# Patient Record
Sex: MALE | Race: WHITE
[De-identification: names, ages, dates, MRNs, and addresses within clinical notes are randomized per-mention and may not be internally consistent; named-entity substitution may affect disease eponyms.]

---

## 2017-04-28 ENCOUNTER — HOSPITAL ENCOUNTER (OUTPATIENT)
Dept: HOSPITAL 74 - FASU | Age: 57
Discharge: HOME | End: 2017-04-28
Attending: ORTHOPAEDIC SURGERY
Payer: COMMERCIAL

## 2017-04-28 VITALS — DIASTOLIC BLOOD PRESSURE: 74 MMHG | HEART RATE: 81 BPM | SYSTOLIC BLOOD PRESSURE: 121 MMHG

## 2017-04-28 VITALS — TEMPERATURE: 98.3 F

## 2017-04-28 VITALS — BODY MASS INDEX: 42.7 KG/M2

## 2017-04-28 DIAGNOSIS — M65.811: ICD-10-CM

## 2017-04-28 DIAGNOSIS — S43.431A: ICD-10-CM

## 2017-04-28 DIAGNOSIS — M25.811: ICD-10-CM

## 2017-04-28 DIAGNOSIS — M75.101: Primary | ICD-10-CM

## 2017-04-28 DIAGNOSIS — S43.491A: ICD-10-CM
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
## 2017-04-28 DIAGNOSIS — M66.821: ICD-10-CM
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
## 2017-04-28 PROCEDURE — 0RNJ4ZZ RELEASE RIGHT SHOULDER JOINT, PERCUTANEOUS ENDOSCOPIC APPROACH: ICD-10-PCS | Performed by: ORTHOPAEDIC SURGERY

## 2017-04-28 PROCEDURE — 0LB14ZZ EXCISION OF RIGHT SHOULDER TENDON, PERCUTANEOUS ENDOSCOPIC APPROACH: ICD-10-PCS | Performed by: ORTHOPAEDIC SURGERY

## 2017-04-28 PROCEDURE — 0PB94ZZ EXCISION OF RIGHT CLAVICLE, PERCUTANEOUS ENDOSCOPIC APPROACH: ICD-10-PCS | Performed by: ORTHOPAEDIC SURGERY

## 2017-04-28 PROCEDURE — 0RBJ4ZZ EXCISION OF RIGHT SHOULDER JOINT, PERCUTANEOUS ENDOSCOPIC APPROACH: ICD-10-PCS | Performed by: ORTHOPAEDIC SURGERY

## 2017-04-28 PROCEDURE — 0RQJ4ZZ REPAIR RIGHT SHOULDER JOINT, PERCUTANEOUS ENDOSCOPIC APPROACH: ICD-10-PCS | Performed by: ORTHOPAEDIC SURGERY

## 2017-05-01 NOTE — OP
DATE OF OPERATION:  04/28/2017

 

SURGEON:  Andres Arroyo MD

 

ASSISTANT:  FAUSTINO Maynard

 

PREOPERATIVE DIAGNOSIS:   

1.  Right shoulder rotator cuff tear.

2.  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

3.  Right shoulder labral tear and anterior and posterior synovitis. 

4.  Right shoulder biceps tendon long head tear. 

 

POSTOPERATIVE DIAGNOSIS:    

1.  Right shoulder rotator cuff tear.

2.  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

3.  Right shoulder labral tear and anterior and posterior synovitis. 

4.  Right shoulder biceps tendon long head tear. 

 

PROCEDURE:  

1.  Right shoulder arthroscopy with rotator cuff repair. 

2.  Right shoulder arthroscopy with subacromial decompression. 

3.  Right shoulder arthroscopy with resection of distal clavicle acromioclavicular

joint.

4.  Right shoulder arthroscopy with debridement of synovectomy major.

5.  Right shoulder arthroscopy with biceps tendon release. CPT code 18953, 18301,

24317, 25746, 13572.

 

FINDINGS:

1.  90% tearing of the long head biceps tendon with dislocation. 

2.  Extensive full-thickness tear of the supraspinatus and infraspinatus tendons with

retraction to midpoint glenohumeral joint.

3.  Thickness of scar, subacromial space.

4.  Anterolateral spurring of acromion. 

5.  Inferior superior clavicle degenerative joint disease. 

 

REPAIR TYPE:  Four mattress sutures were placed into the supraspinatus and secured to

a bleeding bone bed.  They were done in adapted proximal and distal row technique and

secured to bleeding bone bed with direct contact.  Biceps tendon was released due to

extensive tearing debrided.  

 

Please note that the distal clavicle resection including undersurface of greater than

1 cm including articular portion.  

 

PROCEDURE:  Informed consent was obtained.  The patient was taken to the operating

room where the upper extremity was prepped and draped in a sterile fashion.  The

shoulder was manipulated for a full range of motion. 

 

Posterior incision portal was made and directed to glenohumeral joint.  Under direct

visualization, an anterior incision and portal was made.  Extensive synovitis, as

well as chondral injuries throughout the glenohumeral joint were dbrided and removed.

 Any identified labral injuries, including superior labral tear, anterior and

posterior, and anterior labrum torn portions were removed as well.  Rotator cuff was

visualized and noted to have full-thickness tear.  The edges were debrided. 

Posterior incision portal was redirected to subacromial space where a lateral

incision portal was made.  Excessive and thickened scar tissue noted throughout the

subacromial space, including bursal and scar tissue, were removed.  The type 2

acromion was converted into a flattened type 1 using a kiley for subacromial

decompression.  Distal inferior spur at the distal clavicle was also dbrided with the

use of accessory portal in the AC joint. 

 

The edges of the rotator cuff were identified.  Sutures were placed into the rotator

cuff and secured using anchors throughout the greater tuberosity.  Prior to securing,

a bleeding bed was made using a small kiley, creating a bleeding surface of the

rotator cuff insertion. 

 

The shoulder was then drained.  A single suture as placed on all portals and a

sterile dressing was placed.  The patient was transferred to the recovery room

without complication. 

 

 

ANDRES ARROYO M.D.

 

[id number]

DD: 04/30/2017 20:42

DT: 05/01/2017 00:10

Job #:  [id number]

## 2017-05-03 NOTE — PATH
Surgical Pathology Report



Patient Name:  IVET MACIAS

Accession #:  

[facility]. Rec. #:  [id number]                                                        

   /Age/Gender:  1960 (Age: 57) / M

Account:  [id number]                                                          

             [facility] 

Taken:  2017

Received:  2017

Reported:  5/3/2017

Physicians:  Andres García M.D.

  



Specimen(s) Received

 RIGHT SHOULDER SHAVINGS 





Clinical History

Right shoulder rotator cuff tear







Final Diagnosis

RIGHT SHOULDER, ARTHROSCOPIC SHAVING: 

PORTIONS OF SYNOVIUM, CARTILAGE, SKELETAL MUSCLE AND BONE CONSISTENT WITH

ARTHROSCOPIC SHAVINGS.





***Electronically Signed***

Jj Edmond M.D.





Gross Description

Received in formalin, labeled "right shoulder shavings," is a 3.0 x 3.0 x 0.3

cm. aggregate of tan-yellow soft tissue fragments. A representative portion is

submitted in one cassette.

## 2017-09-29 ENCOUNTER — HOSPITAL ENCOUNTER (OUTPATIENT)
Dept: HOSPITAL 74 - FASU | Age: 57
Discharge: HOME | End: 2017-09-29
Attending: ORTHOPAEDIC SURGERY
Payer: COMMERCIAL

## 2017-09-29 VITALS — SYSTOLIC BLOOD PRESSURE: 129 MMHG | HEART RATE: 77 BPM | DIASTOLIC BLOOD PRESSURE: 77 MMHG

## 2017-09-29 VITALS — TEMPERATURE: 98.2 F

## 2017-09-29 VITALS — BODY MASS INDEX: 43.4 KG/M2

## 2017-09-29 DIAGNOSIS — M65.811: ICD-10-CM

## 2017-09-29 DIAGNOSIS — M75.101: Primary | ICD-10-CM

## 2017-09-29 DIAGNOSIS — M75.41: ICD-10-CM

## 2017-09-29 PROCEDURE — 0RBJ4ZZ EXCISION OF RIGHT SHOULDER JOINT, PERCUTANEOUS ENDOSCOPIC APPROACH: ICD-10-PCS | Performed by: ORTHOPAEDIC SURGERY

## 2017-09-29 PROCEDURE — 0RNJ4ZZ RELEASE RIGHT SHOULDER JOINT, PERCUTANEOUS ENDOSCOPIC APPROACH: ICD-10-PCS | Performed by: ORTHOPAEDIC SURGERY

## 2017-09-29 PROCEDURE — 0LB14ZZ EXCISION OF RIGHT SHOULDER TENDON, PERCUTANEOUS ENDOSCOPIC APPROACH: ICD-10-PCS | Performed by: ORTHOPAEDIC SURGERY

## 2017-10-02 NOTE — PATH
Surgical Pathology Report



Patient Name:  IVET MACIAS

Accession #:  [id number]

Med. Rec. #:  [id number]                                                        

   /Age/Gender:  1960 (Age: 57) / M

Account:  [id number]                                                          

             Location: [facility] AMBULATORY 

Taken:  2017

Received:  2017

Reported:  10/2/2017

Physicians:  Andres García M.D.

  



Specimen(s) Received

 RIGHT SHOULDER IMPLANT AND SHAVINGS 





Clinical History

Right shoulder rotator cuff tear







Final Diagnosis

RIGHT SHOULDER IMPLANT AND SHAVINGS:

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

SMALL FRAGMENTS OF UNREMARKABLE BONE AND SKELETAL MUSCLE.

ORTHOPEDIC HARDWARE (GROSS EXAM).





***Electronically Signed***

Alexander Finkelstein, M.D.





Gross Description

Received in formalin, labeled "right shoulder implant and shavings," is a 2.5 cm

in greatest dimension aggregate of tan-yellow soft tissue fragments. A

representative portion is submitted in one cassette.

Also present are 2 portions of metallic hardware each of which measure 0.8 x 0.3

x 0.3 cm, and have attached possible suture material. This is for gross

identification only.

[facility]/2017



[facility]/2017

## 2017-10-07 NOTE — OP
DATE OF OPERATION: 09/29/2017 

 

SURGEON: Andres Arroyo MD

 

ASSISTANT:  FAUSTINO Maynard

 

PREOPERATIVE DIAGNOSES:  

1.  Right shoulder rotator cuff tear.

2.  Right shoulder impingement syndrome.

3.  Right shoulder synovitis.

 

POSTOPERATIVE DIAGNOSES:

1.  Right shoulder rotator cuff tear.

2.  Right shoulder impingement syndrome.

3.  Right shoulder synovitis.

 

PROCEDURE:

1.  Right shoulder arthroscopy with revision repair of supraspinatus rotator cuff

tear.

2.  Right shoulder arthroscopy with subacromial decompression.

3.  Right shoulder arthroscopy with debridement.

 

FINDINGS:

1.  Previous rotator cuff repair with retear and extension into infraspinatus.

2.  No evidence of long head of biceps.

3.  Extensive scar tissue anteriorly and laterally subacromial space.

4.  Previous subacromial decompression with minor posterior spurring recurrence. 

5.  Posterior labral fraying with diffuse grade 2-4 cartilage injury glenoid and

humerus.

 

REPAIR TYPE:  Loose anchors were removed throughout the area site of previous repair.

 Sutures were also removed.  

 

The remnants of the supraspinatus and infraspinatus tendons were freed from scarring

to allow for distraction into the superior portion of the humeral head.  Bleeding

bone bed was made along the cartilage portion of the superior portion of the humerus

to allow for shortening of the rotator cuff and to decrease tension on the repair. 

Anchors were placed across the greater tuberosity lateral to the bleeding bone bed. 

Mattress sutures were placed into the supraspinatus and infraspinatus and secured to

the bleeding bone bed with tension across the anchors.  These were then probed and

found to have good stability.  

 

PROCEDURE:  Informed consent was obtained.  The patient was taken to the operating

room where the upper extremity was prepped and draped in a sterile fashion.  The

shoulder was manipulated for a full range of motion. 

 

Posterior incision portal was made and directed to glenohumeral joint.  Under direct

visualization, an anterior incision and portal was made.  Extensive synovitis, as

well as chondral injuries throughout the glenohumeral joint were dbrided and removed.

 Any identified labral injuries, including superior labral tear, anterior and

posterior, and anterior labrum torn portions were removed as well.  Rotator cuff was

visualized and noted to have full-thickness tear.  The edges were debrided. 

Posterior incision portal was redirected to subacromial space where a lateral

incision portal was made.  Excessive and thickened scar tissue noted throughout the

subacromial space, including bursal and scar tissue, were removed.  The type 2

acromion was converted into a flattened type 1 using a kiley for subacromial

decompression.  Distal inferior spur at the distal clavicle was also dbrided with the

use of accessory portal in the AC joint. 

 

The edges of the rotator cuff were identified.  Sutures were placed into the rotator

cuff and secured using anchors throughout the greater tuberosity.  Prior to securing,

a bleeding bed was made using a small kiley, creating a bleeding surface of the

rotator cuff insertion. 

 

The shoulder was then drained.  A single suture as placed on all portals and a

sterile dressing was placed.  The patient was transferred to the recovery room

without complication. 

 

 

 

ANDRES ARROYO M.D.

 

[id number]

DD: 10/07/2017 14:29

DT: 10/07/2017 14:52

Job #:  [id number]

## 2019-08-24 ENCOUNTER — HOSPITAL ENCOUNTER (INPATIENT)
Dept: HOSPITAL 74 - FER | Age: 59
LOS: 6 days | Discharge: HOME | DRG: 392 | End: 2019-08-30
Attending: NURSE PRACTITIONER | Admitting: INTERNAL MEDICINE
Payer: COMMERCIAL

## 2019-08-24 VITALS — BODY MASS INDEX: 44 KG/M2

## 2019-08-24 DIAGNOSIS — I10: ICD-10-CM

## 2019-08-24 DIAGNOSIS — G47.33: ICD-10-CM

## 2019-08-24 DIAGNOSIS — D72.829: ICD-10-CM

## 2019-08-24 DIAGNOSIS — K57.20: Primary | ICD-10-CM

## 2019-08-24 DIAGNOSIS — E66.01: ICD-10-CM

## 2019-08-24 DIAGNOSIS — K21.9: ICD-10-CM

## 2019-08-24 LAB
ALBUMIN SERPL-MCNC: 3.6 G/DL (ref 3.4–5)
ALP SERPL-CCNC: 50 U/L (ref 45–117)
ALT SERPL-CCNC: 20 U/L (ref 13–61)
ANION GAP SERPL CALC-SCNC: 4 MMOL/L (ref 8–16)
AST SERPL-CCNC: 23 U/L (ref 15–37)
BASOPHILS # BLD: 0.5 % (ref 0–2)
BILIRUB SERPL-MCNC: 1.2 MG/DL (ref 0.2–1)
BUN SERPL-MCNC: 15 MG/DL (ref 7–18)
CALCIUM SERPL-MCNC: 8.4 MG/DL (ref 8.5–10)
CHLORIDE SERPL-SCNC: 102 MMOL/L (ref 98–107)
CO2 SERPL-SCNC: 27 MMOL/L (ref 21–32)
CREAT SERPL-MCNC: 1.1 MG/DL (ref 0.55–1.3)
DEPRECATED RDW RBC AUTO: 13.7 % (ref 11.9–15.9)
EOSINOPHIL # BLD: 1.2 % (ref 0–4.5)
EPITH CASTS URNS QL MICRO: (no result) /HPF
GLUCOSE SERPL-MCNC: 100 MG/DL (ref 74–106)
HCT VFR BLD CALC: 49.8 % (ref 35.4–49)
HGB BLD-MCNC: 16.4 GM/DL (ref 11.7–16.9)
LYMPHOCYTES # BLD: 13.3 % (ref 8–40)
MCH RBC QN AUTO: 30.4 PG (ref 25.7–33.7)
MCHC RBC AUTO-ENTMCNC: 33 G/DL (ref 32–35.9)
MCV RBC: 92.1 FL (ref 80–96)
MONOCYTES # BLD AUTO: 6.7 % (ref 3.8–10.2)
MUCOUS THREADS URNS QL MICRO: (no result)
NEUTROPHILS # BLD: 78.3 % (ref 42.8–82.8)
PLATELET # BLD AUTO: 225 K/MM3 (ref 134–434)
PMV BLD: 9.1 FL (ref 7.5–11.1)
POTASSIUM SERPLBLD-SCNC: 4.1 MMOL/L (ref 3.5–5.1)
PROT SERPL-MCNC: 6.8 G/DL (ref 6.4–8.2)
RBC # BLD AUTO: 5.41 M/MM3 (ref 4–5.6)
SODIUM SERPL-SCNC: 133 MMOL/L (ref 136–145)
WBC # BLD AUTO: 12.2 K/MM3 (ref 4–10.8)

## 2019-08-24 NOTE — PDOC
Attending Attestation





- Resident


Resident Name: Nazario Bakrer





- ED Attending Attestation


I have performed the following: I have examined & evaluated the patient, The 

case was reviewed & discussed with the resident, I agree w/resident's findings 

& plan, Exceptions are as noted





- HPI


HPI: 





08/24/19 17:42


59 M with h/o HTN, GERD, presenting to ED with 2 days of abdominal pain. Pt 

states it started yesterday morning. Pain is localized to LLQ and suprapubic 

region. Does not radiate. Denies N/V but endorses loss of appetite. No diarrhea/

constipation. Denies F/C. Pt went to urgent care yesterday and was prescribed 

flagyl and levaquin, which he has been taking. However, the pain has been 

persistent, so pt presents to ED for further evaluation.


Pt denies CP/SOB. Denies dysuria. Denies flank pain. Denies scrotal pain.








- Physicial Exam


PE: 





08/24/19 17:47


"GENERAL: Awake, alert, and fully oriented, in no acute distress.


HEAD: No signs of trauma


EYES: PERRLA, EOMI, sclera anicteric, conjunctiva clear


ENT: Auricles normal inspection, hearing grossly normal, nares patent, 

oropharynx clear without exudates. Moist mucosa


NECK: Nontender, no stepoffs, Normal ROM, supple, no lymphadenopathy, JVD, or 

masses


LUNGS: Breath sounds equal, clear to auscultation bilaterally.  No wheezes, and 

no crackles


HEART: Regular rate and rhythm, normal S1 and S2, no murmurs, rubs or gallops


ABDOMEN: + LLQ TTP, normoactive bowel sounds.  No guarding, no rebound.  No 

masses


EXTREMITIES: Normal range of motion, no edema.  No clubbing or cyanosis. No 

cords, erythema, or tenderness


NEUROLOGICAL: Cranial nerves II through XII intact. 5/5 strength and sensation 

in all extremities, Normal speech, normal gait, normal cerebellar function


SKIN: Warm, Dry, normal turgor, no rashes or lesions noted.





- Medical Decision Making





08/24/19 17:49


59 M with LLQ abdominal pain. Likely colitis vs diverticulitis.


- Labs


- CTAP


- IV tylenol





Pt signed out to Dr. Alvarez at 7PM, pending labs and CT

## 2019-08-24 NOTE — PDOC
History of Present Illness





- General


Chief Complaint: Pain


Stated Complaint: ABDOMINAL PAIN


Time Seen by Provider: 08/24/19 17:11


History Source: Patient


Exam Limitations: No Limitations





- History of Present Illness


Initial Comments: 





59M pmh HTN presenting with 1 day of constant crampy nonradiating LLQ pain w/ a 

few days of LINDSEY but able to tolerate PO. Denies f/c/n/v/d, dysuria, testicular 

pain/swelling. Was seen in urgent care on 08/23/19 for LLQ abdominal pain and 

instructed to proceed to ED for further w/u; r/o diverticulitis. Was discharged 

with flagyl and levaquin. Last BM today. Took tramadol for pain today. 





PMH HTN 


MEDs see chart 


NKDA 


Denies tobacco, drugs 


Endorses social etoh 











Past History





- Past Medical History


Allergies/Adverse Reactions: 


 Allergies











Allergy/AdvReac Type Severity Reaction Status Date / Time


 


No Known Drug Allergies Allergy   Verified 08/24/19 17:16











Home Medications: 


Ambulatory Orders





Cholecalciferol (Vitamin D3) [Vitamin D3] 2,000 unit PO DAILY 04/24/17 


Lansoprazole [Prevacid] 30 mg PO DAILY 04/24/17 


Multivitamins [Multivit ([facility] Formulary)] 1 tab PO DAILY 04/24/17 


Nebivolol HCl [Bystolic] 10 mg PO DAILY 04/24/17 


traMADol HCL [Ultram -] 50 mg PO Q4H PRN 04/24/17 


Levofloxacin [Levaquin] 750 mg PO DAILY 08/24/19 


Valsartan 320 mg PO DAILY 08/24/19 


metroNIDAZOLE [Flagyl -] 500 mg PO QID 08/24/19 








Anemia: No


Asthma: No


Cancer: No


Cardiac Disorders: No


CVA: No


COPD: No


CHF: No


Dementia: No


Diabetes: No


GI Disorders: Yes (GERD)


 Disorders: No


HTN: Yes


Hypercholesterolemia: No


Liver Disease: No


Seizures: No


Thyroid Disease: No





- Surgical History


Abdominal Surgery: Yes (??Hernia Repair)


Appendectomy: No


Cardiac Surgery: No


Cholecystectomy: No


Lung Surgery: No


Neurologic Surgery: No


Orthopedic Surgery: Yes (Right Knee Arthroscopy,R shoulder arthroscopy)





- Suicide/Smoking/Psychosocial Hx


Smoking History: Never smoked


Hx Alcohol Use: Yes (social)


Drug/Substance Use Hx: No


Substance Use Type: Alcohol, Prescribed


Hx Substance Use Treatment: No





**Review of Systems





- Review of Systems


Able to Perform ROS?: Yes


Comments:: 


CONSTITUTIONAL: Denies F / C


HEENT: Denies headache, lightheadedness, dizziness, changes in vision / hearing

, diplopia, blurry vision. 


RESP: Denies SOB


CARD: Denies chest pain


GI: see hpi. Denies N / V / D, bloody stool, inability to tolerate PO


: Denies dysuria, hematuria


MSK: Denies joint pain











Is the patient limited English proficient: No





*Physical Exam





- Physical Exam


Comments: 





GEN: Nontoxic. AAOx3


HEENT: NC/AT, EOMI, PERRLA. No facial asymmetry. Normal voice. Supple neck w/ 

FROM.


CV: S1/S2, RRR, no m/r/g


LUNG: CTAB, no wheezes, crackles, rales, rhonchi. 


GI: (+)TTP of RLQ, SUPRAPUBIC, AND LLQ w/ guarding. Upper quadrants soft. 

nondistended, +BS, no rebound. No hernias or pulsatile masses palpated. 


: No scrotal or penile swelling. No testicular TTP, erythema, or swelling. No 

hernias or testicular masses palpated. 


EXTREMITIES: No obvious deformities of all extremities. 


SKIN: warm, dry, normal turgor 


PSYCH: normal mood and affect  


NEURO: Moving all extremities well; ambulating well. 





ED Treatment Course





- LABORATORY


CBC & Chemistry Diagram: 


 08/27/19 07:16





 08/27/19 07:16





Medical Decision Making





- Medical Decision Making





08/24/19 17:37








59M presenting with LLQ pain in setting of LINDSEY, was seen in urgent care and 

instructed to go to ED. Sent home w/ flagyl and levaquin. Exam significant for 

RLQ, suprapubic, and LLQ TTP. VS wnl. DDX most likely diverticulitis given hx; 

considering appendicitis, cystitis/UTI. Unlikely ischemic colitis - hx 

inconsistent; pain not out of proportion. Unlikely AAA or AoD given history and 

exam but considering given age and HTN. 


- labs 


- IV fluids


- pain control 


- CT A/P 








08/24/19 20:52


CT A/P demonstrating extensive sigmoid diverticulitis with tiny extraluminal 

air within the adjacent interstitum (pneumoperitoneum). No abscess. 


Surgery contacted. 


Zosyn started. 


Patient is resistant to the idea of admission; may AMA 





08/24/19 21:16


Discussed patient with Surgeon on call Dr. Miles who stated no surgical 

intervention needed at this time, needs transfer to [facility], IVF, IV Abx, 

NPO. 











08/24/19 21:33


There are no beds at [facility] at this moment, notified Dr. Miles regarding this. 

If patient amenable, will admit to [facility] and transfer when [facility] bed 

available. 





08/24/19 22:10


Discussed Patient with CONCHITA Pena; he will speak to Dr. Batres regarding 

transfer. Will reach back. 








Signed out to attending. 








*DC/Admit/Observation/Transfer


Diagnosis at time of Disposition: 


 Diverticulitis








- Discharge Dispostion


Condition at time of disposition: Guarded


Decision to Admit order: Yes





- Referrals





- Patient Instructions





- Post Discharge Activity

## 2019-08-24 NOTE — PDOC
*Physical Exam





- Vital Signs


 Last Vital Signs











Temp Pulse Resp BP Pulse Ox


 


 98.5 F   88   18   112/77   97 


 


 08/24/19 17:11  08/24/19 17:11  08/24/19 17:11  08/24/19 17:11  08/24/19 17:11














ED Treatment Course





- LABORATORY


CBC & Chemistry Diagram: 


 08/24/19 17:50





 08/24/19 17:50





- ADDITIONAL ORDERS


Additional order review: 


 Laboratory  Results











  08/24/19 08/24/19





  17:50 17:50


 


Sodium   133 L


 


Potassium   4.1


 


Chloride   102


 


Carbon Dioxide   27


 


Anion Gap   4 L


 


BUN   15.0


 


Creatinine   1.1


 


Est GFR (CKD-EPI)AfAm   83.53


 


Est GFR (CKD-EPI)NonAf   72.07


 


Random Glucose   100


 


Calcium   8.4 L


 


Total Bilirubin   1.2 H


 


AST   23


 


ALT   20


 


Alkaline Phosphatase   50


 


Total Protein   6.8


 


Albumin   3.6


 


Lipase  57 L 








 











  08/24/19





  17:50


 


RBC  5.41


 


MCV  92.1


 


MCHC  33.0


 


RDW  13.7


 


MPV  9.1


 


Neutrophils %  78.3


 


Lymphocytes %  13.3


 


Monocytes %  6.7


 


Eosinophils %  1.2


 


Basophils %  0.5














- Medications


Given in the ED: 


ED Medications














Discontinued Medications














Generic Name Dose Route Start Last Admin





  Trade Name Freq  PRN Reason Stop Dose Admin


 


Acetaminophen  1,000 mg  08/24/19 17:36  08/24/19 17:55





  Ofirmev Injection -  IVPB  08/24/19 17:37  1,000 mg





  ONCE ONE   Administration





     





     





     





     


 


Sodium Chloride  1,000 ml  08/24/19 17:36  08/24/19 17:50





  Normal Saline -  IV  08/24/19 17:37  1,000 ml





  ONCE ONE   Administration





     





     





     





     














Progress Note





- Progress Note


Progress Note: 





Care of this patient was transferred to me from Dr. fink at 1900 hrs. Patient is 

a 59-year-old male who was started on antibiotics for presumptive 

diverticulitis. Patient's been on the antibiotics 2 days and now comes into 

the emergency room for a CAT scan. Patient was told at the time he was started 

on the antibiotics he should go to the emergency room for a CAT scan but did 

not go for a couple of days. Patient comes otherwise are improving. Patient has 

a CAT scan pending if there is no evidence of perforation or abscess patient 

will be discharged home to continue his antibiotics. 





*DC/Admit/Observation/Transfer


Diagnosis at time of Disposition: 


 Diverticulitis








- Discharge Dispostion


Condition at time of disposition: Guarded


Decision to Admit order: Yes





- Referrals


Referrals: 


Efren Keenan MD [Primary Care Provider] - 





- Patient Instructions





- Post Discharge Activity

## 2019-08-25 LAB
ANION GAP SERPL CALC-SCNC: 9 MMOL/L (ref 8–16)
BUN SERPL-MCNC: 14 MG/DL (ref 7–18)
CALCIUM SERPL-MCNC: 8 MG/DL (ref 8.5–10)
CHLORIDE SERPL-SCNC: 100 MMOL/L (ref 98–107)
CO2 SERPL-SCNC: 27 MMOL/L (ref 21–32)
CREAT SERPL-MCNC: 1 MG/DL (ref 0.55–1.3)
DEPRECATED RDW RBC AUTO: 13.3 % (ref 11.9–15.9)
GLUCOSE SERPL-MCNC: 78 MG/DL (ref 74–106)
HCT VFR BLD CALC: 45.3 % (ref 35.4–49)
HGB BLD-MCNC: 14.6 GM/DL (ref 11.7–16.9)
MAGNESIUM SERPL-MCNC: 1.8 MG/DL (ref 1.8–2.4)
MCH RBC QN AUTO: 29.5 PG (ref 25.7–33.7)
MCHC RBC AUTO-ENTMCNC: 32.3 G/DL (ref 32–35.9)
MCV RBC: 91.3 FL (ref 80–96)
PLATELET # BLD AUTO: 204 K/MM3 (ref 134–434)
PMV BLD: 9.8 FL (ref 7.5–11.1)
POTASSIUM SERPLBLD-SCNC: 3.9 MMOL/L (ref 3.5–5.1)
RBC # BLD AUTO: 4.96 M/MM3 (ref 4–5.6)
SODIUM SERPL-SCNC: 136 MMOL/L (ref 136–145)
WBC # BLD AUTO: 10.4 K/MM3 (ref 4–10.8)

## 2019-08-25 RX ADMIN — SODIUM CHLORIDE SCH MLS/HR: 9 INJECTION, SOLUTION INTRAVENOUS at 09:15

## 2019-08-25 RX ADMIN — PIPERACILLIN SODIUM,TAZOBACTAM SODIUM SCH MLS/HR: 3; .375 INJECTION, POWDER, FOR SOLUTION INTRAVENOUS at 10:56

## 2019-08-25 RX ADMIN — PIPERACILLIN SODIUM,TAZOBACTAM SODIUM SCH MLS/HR: 3; .375 INJECTION, POWDER, FOR SOLUTION INTRAVENOUS at 01:33

## 2019-08-25 RX ADMIN — HEPARIN SODIUM SCH UNIT: 5000 INJECTION, SOLUTION INTRAVENOUS; SUBCUTANEOUS at 21:15

## 2019-08-25 RX ADMIN — PANTOPRAZOLE SODIUM SCH MG: 40 INJECTION, POWDER, FOR SOLUTION INTRAVENOUS at 09:45

## 2019-08-25 RX ADMIN — HEPARIN SODIUM SCH UNIT: 5000 INJECTION, SOLUTION INTRAVENOUS; SUBCUTANEOUS at 10:56

## 2019-08-25 RX ADMIN — PIPERACILLIN SODIUM,TAZOBACTAM SODIUM SCH MLS/HR: 3; .375 INJECTION, POWDER, FOR SOLUTION INTRAVENOUS at 18:42

## 2019-08-25 NOTE — CONSULT
- Consultation


REQUESTING PROVIDER: Mj BLACK





CONSULT REQUEST: We have been asked to surgically evaluate this patient for (

specify).





PCP:BOBO Douglas





HISTORY OF PRESENT ILLNESS: ANDRIA who is a 60 y/o male who presented to the 

[facility] after [place] [facility] [place]; w/u reveals 

diverticulitis w/microperforation; he had # hours of LLQ abdominal pain 

yesterday before he ws first evaluated [place] [facility]; he has had a colonoscopy in 

the past and did relate to haveing diverticulosis; he has no other GI/ c/o.





PMHx:  GERD/HTN        





PSHx:  ortho surgery  ; hernia repair    


 Home Medications











 Medication  Instructions  Recorded


 


Cholecalciferol (Vitamin D3) 2,000 unit PO DAILY 04/24/17





[Vitamin D3]  


 


Lansoprazole [Prevacid] 30 mg PO DAILY 04/24/17


 


Multivitamins [Multivit ([facility] 1 tab PO DAILY 04/24/17





Formulary)]  


 


Nebivolol HCl [Bystolic] 10 mg PO DAILY 04/24/17


 


traMADol HCL [Ultram -] 50 mg PO Q4H PRN 04/24/17


 


Levofloxacin [Levaquin] 750 mg PO DAILY 08/24/19


 


Valsartan 320 mg PO DAILY 08/24/19


 


metroNIDAZOLE [Flagyl -] 500 mg PO QID 08/24/19








 Allergies











Allergy/AdvReac Type Severity Reaction Status Date / Time


 


No Known Drug Allergies Allergy   Verified 08/24/19 17:16








REVIEW OF SYSTEMS:


CONSTITUTIONAL: 


Absent:  fever, chills, diaphoresis, generalized weakness, malaise, loss of 

appetite, weight change


CARDIOVASCULAR: 


Absent: chest pain, syncope, palpitations, irregular heart rate, lightheadedness

, peripheral edema


RESPIRATORY: 


Absent: cough, shortness of breath, dyspnea with exertion, wheezing, stridor, 

hemoptysis


GASTROINTESTINAL:


Absent: abdominal pain, abdominal distension, nausea, vomiting, diarrhea, 

constipation, melena, hematochezia


GENITOURINARY: 


Absent: dysuria, frequency, urgency, hesitancy, hematuria, flank pain, genital 

pain


MUSCULOSKELETAL: 


Present: myalgia, arthralgia, joint swelling, back pain, neck pain


SKIN: 


Absent: rash, itching, pallor


HEMATOLOGIC/IMMUNOLOGIC: 


Absent: easy bleeding, easy bruising, lymphadenopathy


NEUROLOGIC: 


Absent: headache, focal weakness, paresthesias, dizziness, unsteady gait, 

seizure, mental status changes, 


bladder or bowel incontinence


PSYCHIATRIC: 


Absent: anxiety, depression, suicidal or homicidal ideation, hallucinations.





PHYSICAL EXAM:


GENERAL: Awake, alert, and fully oriented, in no acute distress.


HEAD: Normal with no signs of trauma.


EYES: sclera anicteric, conjunctiva clear.


NECK: Normal ROM, supple without lymphadenopathy, JVD, or masses.


ABDOMEN: Soft, tender focally LLQ w/guarding; no rebound, not distended, 

normoactive bowel sounds,no masses.  No organomegaly. No hernias.


MUSCULOSKELETAL: Normal ROM at all joints. No bony deformities or tenderness. 

No CVA tenderness.


UPPER EXTREMITIES: 2+ pulses, warm, well-perfused. No cyanosis. Cap refill <2 

seconds. No peripheral edema.


LOWER EXTREMITIES: 2+ pulses, warm, well-perfused. No calf tenderness. No 

peripheral edema. 


NEUROLOGICAL: Normal speech, gait not observed.


PSYCH: Cooperative. Good eye contact. Appropriate mood and affect.


SKIN: Warm, dry, normal turgor, no rashes or lesions noted.


 Vital Signs











Temperature  98.5 F   08/25/19 04:16


 


Pulse Rate  72   08/25/19 04:16


 


Respiratory Rate  19   08/25/19 04:16


 


Blood Pressure  110/68   08/25/19 04:16


 


O2 Sat by Pulse Oximetry (%)  97   08/24/19 23:16








 Lab Results











WBC  10.4 K/mm3 (4.0-10.8)   08/25/19  06:00    


 


RBC  4.96 M/mm3 (4.00-5.60)   08/25/19  06:00    


 


Hgb  14.6 GM/dl (11.7-16.9)   08/25/19  06:00    


 


Hct  45.3 % (35.4-49)   08/25/19  06:00    


 


MCV  91.3 fl (80-96)   08/25/19  06:00    


 


MCHC  32.3 g/dl (32.0-35.9)   08/25/19  06:00    


 


RDW  13.3 % (11.9-15.9)   08/25/19  06:00    


 


Plt Count  204 K/MM3 (134-434)   08/25/19  06:00    


 


Sodium  136 mmol/L (136-145)   08/25/19  06:00    


 


Potassium  3.9 mmol/L (3.5-5.1)   08/25/19  06:00    


 


Chloride  100 mmol/L ()   08/25/19  06:00    


 


Carbon Dioxide  27 mmol/L (21-32)   08/25/19  06:00    


 


Anion Gap  9 MMOL/L (8-16)   08/25/19  06:00    


 


BUN  14.0 mg/dl (7-18)   08/25/19  06:00    


 


Creatinine  1.0 mg/dl (0.55-1.3)   08/25/19  06:00    


 


Random Glucose  78 mg/dl ()   08/25/19  06:00    


 


Calcium  8.0 mg/dl (8.5-10)  L  08/25/19  06:00    








CT a/p reviewed





IMP: diverticulitis w/microperforation





PLAN: NPO/IVF/IVABS/trend VS and WBC; will f/u.





Al Miles MD FACS

## 2019-08-25 NOTE — CON.ID
Consult


Consult Specialty:: infectious diseases


Referred by:: Cherise


Reason for Consultation:: diverticulitis





- History of Present Illness


Chief Complaint: abd pain


History of Present Illness: 





59 M with h/o HTN, GERD, presenting to ED with abdominal pain since Thursday. 

Pain is localized to LLQ . e. Denies N/V but endorses loss of appetite. + loose 

BM, non-bloody. Pt went to urgent care on Friday and was prescribed flagyl and 

levaquin, which he has been taking. However, the pain has been persistent, so 

pt came to the hospital.


on admission patient was worked up and ct scan showed diverticular perf 


seen by surgical team 





- History Source


History Provided By: Patient


Limitations to Obtaining History: No Limitations





- Past Medical History


Cardio/Vascular: Yes: HTN


Gastrointestinal: Yes: GERD





- Past Surgical History


Past Surgical History: Yes: Hernia Repair





- Alcohol/Substance Use


Hx Alcohol Use: Yes (social)





- Smoking History


Smoking history: Never smoked





Home Medications





- Allergies


Allergies/Adverse Reactions: 


 Allergies











Allergy/AdvReac Type Severity Reaction Status Date / Time


 


No Known Drug Allergies Allergy   Verified 08/24/19 17:16














- Home Medications


Home Medications: 


Ambulatory Orders





Cholecalciferol (Vitamin D3) [Vitamin D3] 2,000 unit PO DAILY 04/24/17 


Lansoprazole [Prevacid] 30 mg PO DAILY 04/24/17 


Multivitamins [Multivit ([facility] Form[facility])] 1 tab PO DAILY 04/24/17 


Nebivolol HCl [Bystolic] 10 mg PO DAILY 04/24/17 


traMADol HCL [Ultram -] 50 mg PO Q4H PRN 04/24/17 


Levofloxacin [Levaquin] 750 mg PO DAILY 08/24/19 


Valsartan 320 mg PO DAILY 08/24/19 


metroNIDAZOLE [Flagyl -] 500 mg PO QID 08/24/19 











Review of Systems





- Review of Systems


Constitutional: reports: No Symptoms


Eyes: reports: No Symptoms


HENT: reports: No Symptoms


Neck: reports: No Symptoms


Cardiovascular: reports: No Symptoms


Respiratory: reports: No Symptoms


Gastrointestinal: reports: Abdominal Pain (left lower quadrant)


Musculoskeletal: reports: No Symptoms


Integumentary: reports: No Symptoms


Neurological: reports: No Symptoms


Endocrine: reports: No Symptoms


Hematology/Lymphatic: reports: No Symptoms


Psychiatric: reports: No Symptoms





Physical Exam


Vital Signs: 


 Vital Signs











Temperature  98.5 F   08/25/19 04:16


 


Pulse Rate  72   08/25/19 04:16


 


Respiratory Rate  19   08/25/19 04:16


 


Blood Pressure  110/68   08/25/19 04:16


 


O2 Sat by Pulse Oximetry (%)  97   08/24/19 23:16











Constitutional: Yes: Well Nourished, Calm, Mild Distress, Obese


HENT: Yes: Atraumatic, Normocephalic


Neck: Yes: Supple, Trachea Midline


Cardiovascular: Yes: Regular Rate and Rhythm


Respiratory: Yes: Regular, CTA Bilaterally


Gastrointestinal: Yes: Hypoactive Bowel Sounds, Tenderness


Musculoskeletal: Yes: WNL


Extremities: Yes: WNL


Neurological: Yes: Alert, Oriented


Psychiatric: Yes: Alert, Oriented


Labs: 


 CBC, BMP





 08/25/19 06:00 





 08/25/19 06:00 











Imaging





- Results


Chest X-ray: Report Reviewed, Image Reviewed


Cat Scan: Report Reviewed, Image Reviewed





Assessment/Plan





this obese patient coming to the hospital because of persistent abd pain 

because of diverticulitis who failed outpatient treatment and with microperf now





abd pain 


diverticulitis


obesity





plan


will start on zosyn


monitor


iv fluids


npo


rest as per the team

## 2019-08-25 NOTE — EKG
Test Reason : 

Blood Pressure : ***/*** mmHG

Vent. Rate : 069 BPM     Atrial Rate : 069 BPM

   P-R Int : 138 ms          QRS Dur : 092 ms

    QT Int : 406 ms       P-R-T Axes : 046 009 032 degrees

   QTc Int : 435 ms

 

NORMAL SINUS RHYTHM

NORMAL ECG

NO PREVIOUS ECGS AVAILABLE

Confirmed by STACI BLACK, SHRADDHA ([id number]) on 8/25/2019 11:47:58 AM

 

Referred By: MD LUA           Confirmed By:SHRADDHA SMALL MD

## 2019-08-25 NOTE — HP
Admitting History and Physical





- Primary Care Physician


PCP: Jnenifer Keenan





- Admission


Chief Complaint: abdominal pain since Thursday


History of Present Illness: 





59 M with h/o HTN, GERD, presenting to ED with abdominal pain since Thursday. 

Pain is localized to LLQ and suprapubic region. Does not radiate. Denies N/V 

but endorses loss of appetite. + loose BM, non-bloody. Pt went to urgent care 

on Friday and was prescribed flagyl and levaquin, which he has been taking. 

However, the pain has been persistent, so pt presents to ED for further 

evaluation.


Pt denies CP/SOB. Denies dysuria. Denies flank pain. Denies scrotal pain.





History Source: Patient


Limitations to Obtaining History: No Limitations





- Past Medical History


Cardiovascular: Yes: HTN


Gastrointestinal: Yes: GERD





- Past Surgical History


Past Surgical History: Yes: Hernia Repair





- Smoking History


Smoking history: Never smoked





- Alcohol/Substance Use


Hx Alcohol Use: Yes (social)





Home Medications





- Allergies


Allergies/Adverse Reactions: 


 Allergies











Allergy/AdvReac Type Severity Reaction Status Date / Time


 


No Known Drug Allergies Allergy   Verified 08/24/19 17:16














- Home Medications


Home Medications: 


Ambulatory Orders





Cholecalciferol (Vitamin D3) [Vitamin D3] 2,000 unit PO DAILY 04/24/17 


Lansoprazole [Prevacid] 30 mg PO DAILY 04/24/17 


Multivitamins [Multivit ([facility] Form[facility])] 1 tab PO DAILY 04/24/17 


Nebivolol HCl [Bystolic] 10 mg PO DAILY 04/24/17 


traMADol HCL [Ultram -] 50 mg PO Q4H PRN 04/24/17 


Levofloxacin [Levaquin] 750 mg PO DAILY 08/24/19 


Valsartan 320 mg PO DAILY 08/24/19 


metroNIDAZOLE [Flagyl -] 500 mg PO QID 08/24/19 











Family Disease History





- Family Disease History


Family History: Denies





Review of Systems





- Review of Systems


Constitutional: reports: No Symptoms


Eyes: reports: No Symptoms


HENT: reports: No Symptoms


Neck: reports: No Symptoms


Cardiovascular: reports: No Symptoms


Respiratory: reports: No Symptoms


Gastrointestinal: reports: Abdominal Pain, Other (loss of appetite)


Genitourinary: reports: No Symptoms


Breasts: reports: No Symptoms Reported


Musculoskeletal: reports: No Symptoms


Integumentary: reports: No Symptoms


Neurological: reports: No Symptoms


Endocrine: reports: No Symptoms


Hematology/Lymphatic: reports: No Symptoms


Psychiatric: reports: No Symptoms





Physical Examination


Vital Signs: 


 Vital Signs











Temperature  98.5 F   08/25/19 04:16


 


Pulse Rate  72   08/25/19 04:16


 


Respiratory Rate  19   08/25/19 04:16


 


Blood Pressure  110/68   08/25/19 04:16


 


O2 Sat by Pulse Oximetry (%)  97   08/24/19 23:16











Constitutional: Yes: No Distress, Obese


Eyes: Yes: WNL, Conjunctiva Clear, EOM Intact


HENT: Yes: WNL, Atraumatic, Normocephalic


Neck: Yes: WNL, Supple, Trachea Midline


Cardiovascular: Yes: WNL, Regular Rate and Rhythm


Respiratory: Yes: WNL, Regular, CTA Bilaterally


Gastrointestinal: Yes: Normal Bowel Sounds, Abdomen, Obese, Tenderness (RLQ, 

SUPRAPUBIC, AND LLQ w/ guarding. No hernias or pulsatile masses palpated.)


...Rectal Exam: Yes: Deferred


Renal/: Yes: WNL


Breast(s): Yes: WNL


Musculoskeletal: Yes: WNL


Extremities: Yes: WNL


Edema: No


Peripheral Pulses WNL: Yes


Integumentary: Yes: Tattoos (multiple to upper extremeties and chest)


Neurological: Yes: WNL, Alert, Oriented


...Motor Strength: WNL


Psychiatric: Yes: WNL, Alert, Oriented


Labs: 


 CBC, BMP





 08/24/19 17:50 





 08/24/19 17:50 











Imaging





- Results


Cat Scan: Image Reviewed (CT A/P demonstrating extensive sigmoid diverticulitis 

with tiny extraluminal air within the adjacent interstitum (pneumoperitoneum). 

No abscess.)





Problem List





- Problems


(1) Perforated diverticulum


Code(s): K57.80 - DVTRCLI OF INTEST, PART UNSP, W PERF AND ABSCESS W/O BLEED   





(2) HTN (hypertension)


Assessment/Plan: 


normotensive 


NPO-holding oral anti hypertensives


will dose with IV if needed


Code(s): I10 - ESSENTIAL (PRIMARY) HYPERTENSION   





(3) GERD (gastroesophageal reflux disease)


Assessment/Plan: 


IV protonix, will switch to oral when taking PO


Code(s): K21.9 - GASTRO-ESOPHAGEAL REFLUX DISEASE WITHOUT ESOPHAGITIS   





(4) Obesity, morbid, BMI 40.0-49.9


Assessment/Plan: 


counseled on weight loss


Code(s): E66.01 - MORBID (SEVERE) OBESITY DUE TO EXCESS CALORIES   





(5) Prophylactic measure


Assessment/Plan: 


FEN


IVF @ 150cc/hr while NPO


monitor electrolytes


low fat, low cholesterol diet when taking PO





DVT


heparin sq   


ambulatory





Dispo


admit to [facility] surg bed, possible transfer to [facility] pending surgery eval


full code


discharge planning


Code(s): Z29.9 - ENCOUNTER FOR PROPHYLACTIC MEASURES, UNSPECIFIED   





(6) Diverticulitis


Assessment/Plan: 


CT A/P demonstrating extensive sigmoid diverticulitis with tiny extraluminal 

air within the adjacent interstitum (pneumoperitoneum). No abscess. 


Surgery consultation pending


Zosyn started in ED


NPO with IVF


IV tylenol prn pain 


Code(s): K57.92 - DVTRCLI OF INTEST, PART UNSP, W/O PERF OR ABSCESS W/O BLEED   





Visit type





- Emergency Visit


Emergency Visit: Yes


ED Registration Date: 08/24/19


Care time: The patient presented to the Emergency Department on the above date 

and was hospitalized for further evaluation of their emergent condition.





- New Patient


This patient is new to me today: Yes


Date on this admission: 08/25/19





- Critical Care


Critical Care patient: No

## 2019-08-26 LAB
ALBUMIN SERPL-MCNC: 2.9 G/DL (ref 3.4–5)
ALP SERPL-CCNC: 44 U/L (ref 45–117)
ALT SERPL-CCNC: 16 U/L (ref 13–61)
ANION GAP SERPL CALC-SCNC: 7 MMOL/L (ref 8–16)
AST SERPL-CCNC: 14 U/L (ref 15–37)
BASOPHILS # BLD: 0.5 % (ref 0–2)
BILIRUB SERPL-MCNC: 0.8 MG/DL (ref 0.2–1)
BUN SERPL-MCNC: 13 MG/DL (ref 7–18)
CALCIUM SERPL-MCNC: 8.2 MG/DL (ref 8.5–10)
CHLORIDE SERPL-SCNC: 104 MMOL/L (ref 98–107)
CO2 SERPL-SCNC: 28 MMOL/L (ref 21–32)
CREAT SERPL-MCNC: 1.1 MG/DL (ref 0.55–1.3)
DEPRECATED RDW RBC AUTO: 13.5 % (ref 11.9–15.9)
EOSINOPHIL # BLD: 3.7 % (ref 0–4.5)
GLUCOSE SERPL-MCNC: 95 MG/DL (ref 74–106)
HCT VFR BLD CALC: 43.7 % (ref 35.4–49)
HGB BLD-MCNC: 14.5 GM/DL (ref 11.7–16.9)
INR BLD: 1.27 (ref 0.82–1.09)
LYMPHOCYTES # BLD: 21.6 % (ref 8–40)
MAGNESIUM SERPL-MCNC: 1.9 MG/DL (ref 1.8–2.4)
MCH RBC QN AUTO: 30.1 PG (ref 25.7–33.7)
MCHC RBC AUTO-ENTMCNC: 33.2 G/DL (ref 32–35.9)
MCV RBC: 90.7 FL (ref 80–96)
MONOCYTES # BLD AUTO: 8.7 % (ref 3.8–10.2)
NEUTROPHILS # BLD: 65.5 % (ref 42.8–82.8)
PLATELET # BLD AUTO: 203 K/MM3 (ref 134–434)
PMV BLD: 9.8 FL (ref 7.5–11.1)
POTASSIUM SERPLBLD-SCNC: 4.2 MMOL/L (ref 3.5–5.1)
PROT SERPL-MCNC: 5.7 G/DL (ref 6.4–8.2)
PT PNL PPP: 14.2 SEC (ref 10.2–13)
RBC # BLD AUTO: 4.82 M/MM3 (ref 4–5.6)
SODIUM SERPL-SCNC: 139 MMOL/L (ref 136–145)
WBC # BLD AUTO: 8.2 K/MM3 (ref 4–10.8)

## 2019-08-26 RX ADMIN — ACETAMINOPHEN PRN MG: 10 INJECTION, SOLUTION INTRAVENOUS at 21:32

## 2019-08-26 RX ADMIN — PIPERACILLIN SODIUM,TAZOBACTAM SODIUM SCH MLS/HR: 3; .375 INJECTION, POWDER, FOR SOLUTION INTRAVENOUS at 02:46

## 2019-08-26 RX ADMIN — HEPARIN SODIUM SCH UNIT: 5000 INJECTION, SOLUTION INTRAVENOUS; SUBCUTANEOUS at 21:38

## 2019-08-26 RX ADMIN — ACETAMINOPHEN PRN MG: 10 INJECTION, SOLUTION INTRAVENOUS at 08:26

## 2019-08-26 RX ADMIN — SODIUM CHLORIDE SCH MLS/HR: 9 INJECTION, SOLUTION INTRAVENOUS at 08:27

## 2019-08-26 RX ADMIN — PIPERACILLIN SODIUM,TAZOBACTAM SODIUM SCH MLS/HR: 3; .375 INJECTION, POWDER, FOR SOLUTION INTRAVENOUS at 17:12

## 2019-08-26 RX ADMIN — PANTOPRAZOLE SODIUM SCH MG: 40 INJECTION, POWDER, FOR SOLUTION INTRAVENOUS at 10:20

## 2019-08-26 RX ADMIN — PIPERACILLIN SODIUM,TAZOBACTAM SODIUM SCH MLS/HR: 3; .375 INJECTION, POWDER, FOR SOLUTION INTRAVENOUS at 09:42

## 2019-08-26 RX ADMIN — HEPARIN SODIUM SCH UNIT: 5000 INJECTION, SOLUTION INTRAVENOUS; SUBCUTANEOUS at 10:42

## 2019-08-27 LAB
ALBUMIN SERPL-MCNC: 3 G/DL (ref 3.4–5)
ALP SERPL-CCNC: 39 U/L (ref 45–117)
ALT SERPL-CCNC: 16 U/L (ref 13–61)
ANION GAP SERPL CALC-SCNC: 4 MMOL/L (ref 8–16)
AST SERPL-CCNC: 18 U/L (ref 15–37)
BASOPHILS # BLD: 0.5 % (ref 0–2)
BILIRUB SERPL-MCNC: 0.6 MG/DL (ref 0.2–1)
BUN SERPL-MCNC: 9 MG/DL (ref 7–18)
CALCIUM SERPL-MCNC: 8.5 MG/DL (ref 8.5–10)
CHLORIDE SERPL-SCNC: 106 MMOL/L (ref 98–107)
CO2 SERPL-SCNC: 30 MMOL/L (ref 21–32)
CREAT SERPL-MCNC: 1.1 MG/DL (ref 0.55–1.3)
DEPRECATED RDW RBC AUTO: 13.3 % (ref 11.9–15.9)
EOSINOPHIL # BLD: 6 % (ref 0–4.5)
GLUCOSE SERPL-MCNC: 133 MG/DL (ref 74–106)
HCT VFR BLD CALC: 43.1 % (ref 35.4–49)
HGB BLD-MCNC: 14.5 GM/DL (ref 11.7–16.9)
INR BLD: 1.18 (ref 0.82–1.09)
LYMPHOCYTES # BLD: 29.4 % (ref 8–40)
MAGNESIUM SERPL-MCNC: 2.1 MG/DL (ref 1.8–2.4)
MCH RBC QN AUTO: 30.7 PG (ref 25.7–33.7)
MCHC RBC AUTO-ENTMCNC: 33.6 G/DL (ref 32–35.9)
MCV RBC: 91.4 FL (ref 80–96)
MONOCYTES # BLD AUTO: 8.6 % (ref 3.8–10.2)
NEUTROPHILS # BLD: 55.5 % (ref 42.8–82.8)
PLATELET # BLD AUTO: 219 K/MM3 (ref 134–434)
PMV BLD: 9.3 FL (ref 7.5–11.1)
POTASSIUM SERPLBLD-SCNC: 4.6 MMOL/L (ref 3.5–5.1)
PROT SERPL-MCNC: 5.9 G/DL (ref 6.4–8.2)
PT PNL PPP: 13.2 SEC (ref 10.2–13)
RBC # BLD AUTO: 4.71 M/MM3 (ref 4–5.6)
SODIUM SERPL-SCNC: 140 MMOL/L (ref 136–145)
WBC # BLD AUTO: 6.1 K/MM3 (ref 4–10.8)

## 2019-08-27 RX ADMIN — PIPERACILLIN SODIUM,TAZOBACTAM SODIUM SCH MLS/HR: 3; .375 INJECTION, POWDER, FOR SOLUTION INTRAVENOUS at 18:21

## 2019-08-27 RX ADMIN — ACETAMINOPHEN PRN MG: 10 INJECTION, SOLUTION INTRAVENOUS at 18:21

## 2019-08-27 RX ADMIN — HEPARIN SODIUM SCH UNIT: 5000 INJECTION, SOLUTION INTRAVENOUS; SUBCUTANEOUS at 10:21

## 2019-08-27 RX ADMIN — PANTOPRAZOLE SODIUM SCH MG: 40 INJECTION, POWDER, FOR SOLUTION INTRAVENOUS at 10:20

## 2019-08-27 RX ADMIN — VALSARTAN SCH MG: 160 TABLET, FILM COATED ORAL at 10:20

## 2019-08-27 RX ADMIN — PIPERACILLIN SODIUM,TAZOBACTAM SODIUM SCH MLS/HR: 3; .375 INJECTION, POWDER, FOR SOLUTION INTRAVENOUS at 01:06

## 2019-08-27 RX ADMIN — HEPARIN SODIUM SCH UNIT: 5000 INJECTION, SOLUTION INTRAVENOUS; SUBCUTANEOUS at 21:11

## 2019-08-27 RX ADMIN — PIPERACILLIN SODIUM,TAZOBACTAM SODIUM SCH MLS/HR: 3; .375 INJECTION, POWDER, FOR SOLUTION INTRAVENOUS at 10:20

## 2019-08-27 RX ADMIN — NEBIVOLOL HYDROCHLORIDE SCH MG: 10 TABLET ORAL at 10:19

## 2019-08-28 LAB
INR BLD: 1.21 (ref 0.82–1.09)
PT PNL PPP: 13.5 SEC (ref 10.2–13)

## 2019-08-28 RX ADMIN — NEBIVOLOL HYDROCHLORIDE SCH MG: 10 TABLET ORAL at 09:59

## 2019-08-28 RX ADMIN — HEPARIN SODIUM SCH UNIT: 5000 INJECTION, SOLUTION INTRAVENOUS; SUBCUTANEOUS at 21:55

## 2019-08-28 RX ADMIN — ACETAMINOPHEN PRN MG: 10 INJECTION, SOLUTION INTRAVENOUS at 23:31

## 2019-08-28 RX ADMIN — PIPERACILLIN SODIUM,TAZOBACTAM SODIUM SCH MLS/HR: 3; .375 INJECTION, POWDER, FOR SOLUTION INTRAVENOUS at 19:15

## 2019-08-28 RX ADMIN — PIPERACILLIN SODIUM,TAZOBACTAM SODIUM SCH MLS/HR: 3; .375 INJECTION, POWDER, FOR SOLUTION INTRAVENOUS at 02:16

## 2019-08-28 RX ADMIN — HEPARIN SODIUM SCH UNIT: 5000 INJECTION, SOLUTION INTRAVENOUS; SUBCUTANEOUS at 10:00

## 2019-08-28 RX ADMIN — PIPERACILLIN SODIUM,TAZOBACTAM SODIUM SCH MLS/HR: 3; .375 INJECTION, POWDER, FOR SOLUTION INTRAVENOUS at 10:02

## 2019-08-28 RX ADMIN — PANTOPRAZOLE SODIUM SCH MG: 40 INJECTION, POWDER, FOR SOLUTION INTRAVENOUS at 10:02

## 2019-08-28 RX ADMIN — VALSARTAN SCH MG: 160 TABLET, FILM COATED ORAL at 10:00

## 2019-08-29 LAB
ALBUMIN SERPL-MCNC: 3.6 G/DL (ref 3.4–5)
ALP SERPL-CCNC: 47 U/L (ref 45–117)
ALT SERPL-CCNC: 28 U/L (ref 13–61)
ANION GAP SERPL CALC-SCNC: 10 MMOL/L (ref 8–16)
AST SERPL-CCNC: 29 U/L (ref 15–37)
BASOPHILS # BLD: 1 % (ref 0–2)
BILIRUB SERPL-MCNC: 0.6 MG/DL (ref 0.2–1)
BUN SERPL-MCNC: 6 MG/DL (ref 7–18)
CALCIUM SERPL-MCNC: 8.9 MG/DL (ref 8.5–10)
CHLORIDE SERPL-SCNC: 102 MMOL/L (ref 98–107)
CO2 SERPL-SCNC: 28 MMOL/L (ref 21–32)
CREAT SERPL-MCNC: 1.1 MG/DL (ref 0.55–1.3)
DEPRECATED RDW RBC AUTO: 13.3 % (ref 11.9–15.9)
EOSINOPHIL # BLD: 7.3 % (ref 0–4.5)
GLUCOSE SERPL-MCNC: 88 MG/DL (ref 74–106)
HCT VFR BLD CALC: 49.4 % (ref 35.4–49)
HGB BLD-MCNC: 16.4 GM/DL (ref 11.7–16.9)
LYMPHOCYTES # BLD: 34.9 % (ref 8–40)
MAGNESIUM SERPL-MCNC: 1.9 MG/DL (ref 1.8–2.4)
MCH RBC QN AUTO: 30.3 PG (ref 25.7–33.7)
MCHC RBC AUTO-ENTMCNC: 33.2 G/DL (ref 32–35.9)
MCV RBC: 91.1 FL (ref 80–96)
MONOCYTES # BLD AUTO: 7.5 % (ref 3.8–10.2)
NEUTROPHILS # BLD: 49.3 % (ref 42.8–82.8)
PLATELET # BLD AUTO: 273 K/MM3 (ref 134–434)
PMV BLD: 9.6 FL (ref 7.5–11.1)
POTASSIUM SERPLBLD-SCNC: 3.9 MMOL/L (ref 3.5–5.1)
PROT SERPL-MCNC: 6.9 G/DL (ref 6.4–8.2)
RBC # BLD AUTO: 5.43 M/MM3 (ref 4–5.6)
SODIUM SERPL-SCNC: 140 MMOL/L (ref 136–145)
WBC # BLD AUTO: 5.7 K/MM3 (ref 4–10.8)

## 2019-08-29 RX ADMIN — PIPERACILLIN SODIUM,TAZOBACTAM SODIUM SCH MLS/HR: 3; .375 INJECTION, POWDER, FOR SOLUTION INTRAVENOUS at 18:21

## 2019-08-29 RX ADMIN — PANTOPRAZOLE SODIUM SCH MG: 40 INJECTION, POWDER, FOR SOLUTION INTRAVENOUS at 09:20

## 2019-08-29 RX ADMIN — PIPERACILLIN SODIUM,TAZOBACTAM SODIUM SCH MLS/HR: 3; .375 INJECTION, POWDER, FOR SOLUTION INTRAVENOUS at 09:20

## 2019-08-29 RX ADMIN — HEPARIN SODIUM SCH UNIT: 5000 INJECTION, SOLUTION INTRAVENOUS; SUBCUTANEOUS at 09:21

## 2019-08-29 RX ADMIN — PIPERACILLIN SODIUM,TAZOBACTAM SODIUM SCH MLS/HR: 3; .375 INJECTION, POWDER, FOR SOLUTION INTRAVENOUS at 02:30

## 2019-08-29 RX ADMIN — VALSARTAN SCH MG: 160 TABLET, FILM COATED ORAL at 09:20

## 2019-08-29 RX ADMIN — NEBIVOLOL HYDROCHLORIDE SCH MG: 10 TABLET ORAL at 09:20

## 2019-08-29 RX ADMIN — HEPARIN SODIUM SCH UNIT: 5000 INJECTION, SOLUTION INTRAVENOUS; SUBCUTANEOUS at 23:07

## 2019-08-30 VITALS — TEMPERATURE: 97.9 F | HEART RATE: 79 BPM | DIASTOLIC BLOOD PRESSURE: 73 MMHG | SYSTOLIC BLOOD PRESSURE: 108 MMHG

## 2019-08-30 RX ADMIN — PIPERACILLIN SODIUM,TAZOBACTAM SODIUM SCH MLS/HR: 3; .375 INJECTION, POWDER, FOR SOLUTION INTRAVENOUS at 09:31

## 2019-08-30 RX ADMIN — PANTOPRAZOLE SODIUM SCH MG: 40 INJECTION, POWDER, FOR SOLUTION INTRAVENOUS at 09:31

## 2019-08-30 RX ADMIN — VALSARTAN SCH MG: 160 TABLET, FILM COATED ORAL at 11:18

## 2019-08-30 RX ADMIN — NEBIVOLOL HYDROCHLORIDE SCH MG: 10 TABLET ORAL at 11:18

## 2019-08-30 RX ADMIN — NEBIVOLOL HYDROCHLORIDE SCH: 10 TABLET ORAL at 09:32

## 2019-08-30 RX ADMIN — HEPARIN SODIUM SCH: 5000 INJECTION, SOLUTION INTRAVENOUS; SUBCUTANEOUS at 09:31

## 2019-08-30 RX ADMIN — PIPERACILLIN SODIUM,TAZOBACTAM SODIUM SCH MLS/HR: 3; .375 INJECTION, POWDER, FOR SOLUTION INTRAVENOUS at 01:25

## 2019-08-30 RX ADMIN — VALSARTAN SCH: 160 TABLET, FILM COATED ORAL at 09:32

## 2019-08-30 NOTE — DS
Physical Exam: 


SUBJECTIVE: Patient seen and examined. Feeling well, feels ready to go home.








OBJECTIVE:





 Vital Signs











 Period  Temp  Pulse  Resp  BP Sys/Velasquez  Pulse Ox


 


 Last 24 Hr  97.7 F-98.4 F  59-79  16-19  103-135/62-85  








PHYSICAL EXAM





GENERAL: The patient is awake, alert, and fully oriented, in no acute distress.


HEAD: Normal with no signs of trauma.


EYES: PERRL, extraocular movements intact, sclera anicteric, conjunctiva clear. 


ENT: Ears normal, nares patent, oropharynx clear without exudates, moist mucous 

membranes.


NECK: Trachea midline, full range of motion, supple. 


LUNGS: Breath sounds equal, clear to auscultation bilaterally, no wheezes, no 

crackles, no accessory muscle use. 


HEART: Regular rate and rhythm, S1, S2 without murmur, rub or gallop.


ABDOMEN: Soft, nontender, nondistended, normoactive bowel sounds, no guarding, 

no rebound, no hepatosplenomegaly, no masses.


EXTREMITIES: 2+ pulses, warm, well-perfused, no edema. 


NEUROLOGICAL: Cranial nerves II through XII grossly intact. Normal speech, gait 

not observed.


PSYCH: Normal mood, normal affect.


SKIN: Warm, dry, normal turgor, no rashes or lesions noted.





LABS


 CBCD











WBC  5.7 K/mm3 (4.0-10.8)   08/29/19  06:45    


 


RBC  5.43 M/mm3 (4.00-5.60)   08/29/19  06:45    


 


Hgb  16.4 GM/dl (11.7-16.9)   08/29/19  06:45    


 


Hct  49.4 % (35.4-49)  H  08/29/19  06:45    


 


MCV  91.1 fl (80-96)   08/29/19  06:45    


 


MCHC  33.2 g/dl (32.0-35.9)   08/29/19  06:45    


 


RDW  13.3 % (11.9-15.9)   08/29/19  06:45    


 


Plt Count  273 K/MM3 (134-434)  D 08/29/19  06:45    


 


MPV  9.6 fl (7.5-11.1)   08/29/19  06:45    








 CMP











Sodium  140 mmol/L (136-145)   08/29/19  06:45    


 


Potassium  3.9 mmol/L (3.5-5.1)   08/29/19  06:45    


 


Chloride  102 mmol/L ()   08/29/19  06:45    


 


Carbon Dioxide  28 mmol/L (21-32)   08/29/19  06:45    


 


Anion Gap  10 MMOL/L (8-16)   08/29/19  06:45    


 


BUN  6.0 mg/dl (7-18)  L  08/29/19  06:45    


 


Creatinine  1.1 mg/dl (0.55-1.3)   08/29/19  06:45    


 


Calcium  8.9 mg/dl (8.5-10)   08/29/19  06:45    


 


Total Bilirubin  0.6 mg/dl (0.2-1)   08/29/19  06:45    


 


AST  29 U/L (15-37)   08/29/19  06:45    


 


ALT  28 U/L (13-61)   08/29/19  06:45    


 


Alkaline Phosphatase  47 U/L ()   08/29/19  06:45    


 


Total Protein  6.9 g/dl (6.4-8.2)   08/29/19  06:45    


 


Albumin  3.6 g/dl (3.4-5.0)   08/29/19  06:45    











HOSPITAL COURSE:





Date of Admission:08/24/19





Date of Discharge: 08/30/19





Pre hospital course


59 year-old male with a PMH significant for HTN, GERD, PAMELLA on CPAP. Admitted 

for acute diverticulitis with microperforation.





Hospital course


Acute diverticulitis with microperforation


   --one previous episode of diverticulitis x 20 years ago, treated medically


   --8/24 CTAP: acute diverticulitis proximal sigmoid with significant stranding

, a few tiny extraluminal air pockets; no gross abscess


   --remained afebrile throughout hospital stay; mild leukocytosis on admission 

which returned to WNL the next day


   --treated with Zosyn x 5 days; discharged on another 7 days of augmentin and 

metronidazole 


   --was seen and evaluated by surgery, no indication for surgery


   --diet was advanced, tolerating regular food at time of discharge


   --to follow up with PCP





Hypertension


   --BP remained stable


   --continued valsartan, Bystolic





GERD


   --protonix





PAMELLA


   --uses CPAP at home


   --found using NC at night sufficient, did not want to bring CPAP from home; 

could not tolerate BIPAP mask 











Minutes to complete discharge: 35





Discharge Summary


Reason For Visit: DIVERTICULITIS


Current Active Problems





Diverticulitis (Acute)


Diverticulitis of sigmoid colon (Acute)


GERD (gastroesophageal reflux disease) (Acute)


HTN (hypertension) (Acute)


Obesity, morbid, BMI 40.0-49.9 (Acute)


Perforated diverticulum (Acute)


Prophylactic measure (Acute)








Condition: Improved





- Instructions


Diet, Activity, Other Instructions: 


Two prescriptions have been sent to your pharmacy, augmentin and metronidazole, 

both are antibiotics. Take these medications as directed and be sure to finish 

all the medication.





As we discussed, you should make an appointment to see Dr. Ryder to re-establish 

your relationship. Try to see him as soon as you finish the antibiotics.





Return to the emergency room for any new or worsening symptoms.


Referrals: 


Nazario Ryder MD [Staff Physician] - 


Disposition: HOME





- Home Medications


Comprehensive Discharge Medication List: 


Ambulatory Orders





Cholecalciferol (Vitamin D3) [Vitamin D3] 2,000 unit PO DAILY 04/24/17 


Lansoprazole [Prevacid] 30 mg PO DAILY 04/24/17 


Multivitamins [Multivit ([facility] Formul[facility])] 1 tab PO DAILY 04/24/17 


Nebivolol HCl [Bystolic] 10 mg PO DAILY 04/24/17 


traMADol HCL [Ultram -] 50 mg PO Q4H PRN 04/24/17 


Levofloxacin [Levaquin] 750 mg PO DAILY 08/24/19 


Valsartan 320 mg PO DAILY 08/24/19 


metroNIDAZOLE [Flagyl -] 500 mg PO QID 08/24/19 








This patient is new to me today: No


Emergency Visit: Yes


ED Registration Date: 08/24/19


Care time: The patient presented to the Emergency Department on the above date 

and was hospitalized for further evaluation of their emergent condition.


Critical Care patient: No





- Discharge Referral


Referred to [facility] No

## 2019-08-30 NOTE — PN
Physical Exam: 





SUBJECTIVE: Patient seen and examined at bedside. Has mild LLQ pain. Several 

episodes of diarrhea.








OBJECTIVE:





 Vital Signs











 Period  Temp  Pulse  Resp  BP Sys/Velasquez  Pulse Ox


 


 Last 24 Hr  97.8 F-98.7 F  72-85  18-20  100-120/67-82  95-99











GENERAL: The patient is awake, alert, and fully oriented, in no acute distress.


LUNGS: Breath sounds equal, clear to auscultation bilaterally, no wheezes, no 

crackles, no accessory muscle use. 


HEART: Regular rate and rhythm, S1, S2 


ABDOMEN: Soft, LLQ and LMQ tenderness, hypoactive bowel sounds no guarding, no 

rebound tenderness 


EXTREMITIES: 2+ pulses, warm, well-perfused, no edema. 


NEUROLOGICAL: Cranial nerves II through XII grossly intact. Normal speech, gait 

not observed.











 Laboratory Results - last 24 hr











  08/26/19 08/26/19 08/26/19





  06:50 06:50 06:50


 


WBC  8.2  


 


RBC  4.82  


 


Hgb  14.5  


 


Hct  43.7  


 


MCV  90.7  


 


MCH  30.1  


 


MCHC  33.2  


 


RDW  13.5  


 


Plt Count  203  


 


MPV  9.8  


 


Absolute Neuts (auto)  5.4  


 


Neutrophils %  65.5  


 


Lymphocytes %  21.6  D  


 


Monocytes %  8.7  


 


Eosinophils %  3.7  D  


 


Basophils %  0.5  


 


PT with INR    14.2 H


 


INR    1.27 H


 


Sodium   139 


 


Potassium   4.2 


 


Chloride   104 


 


Carbon Dioxide   28 


 


Anion Gap   7 L 


 


BUN   13.0 


 


Creatinine   1.1 


 


Est GFR (CKD-EPI)AfAm   83.53 


 


Est GFR (CKD-EPI)NonAf   72.07 


 


Random Glucose   95 


 


Calcium   8.2 L 


 


Magnesium   1.9 


 


Total Bilirubin   0.8 


 


AST   14 L 


 


ALT   16 


 


Alkaline Phosphatase   44 L 


 


Total Protein   5.7 L 


 


Albumin   2.9 L 








                           Active Medications











Generic Name Dose Route Start Last Admin





  Trade Name Freq  PRN Reason Stop Dose Admin


 


Acetaminophen  1,000 mg  08/25/19 08:50  08/26/19 08:26





  Ofirmev Injection -  IVPB   1,000 mg





  Q6H PRN   Administration





  PAIN OR FEVER   





     





     





     


 


Cholecalciferol  2,000 unit  08/25/19 10:00  





  Vitamin D3 -  PO   





  DAILY FARIBA   





     





     





     





     


 


Heparin Sodium (Porcine)  5,000 unit  08/25/19 10:00  08/26/19 10:42





  Heparin -  SQ   5,000 unit





  BID FARIBA   Administration





     





     





     





     


 


Sodium Chloride  1,000 mls @ 150 mls/hr  08/25/19 09:00  08/26/19 08:27





  Normal Saline -  IV   150 mls/hr





  ASDIR FARIBA   Administration





     





     





     





     


 


Piperacillin Sod/Tazobactam  50 mls @ 100 mls/hr  08/25/19 18:00  08/26/19 09:42





  Sod 3.375 gm/ Dextrose  IVPB   100 mls/hr





  Q8H-IV FARIBA   Administration





     





     





  Protocol   





     


 


Nebivolol  10 mg  08/25/19 10:00  





  Bystolic -  PO   





  DAILY FARIBA   





     





     





     





     


 


Pantoprazole Sodium  40 mg  08/25/19 10:00  08/26/19 10:20





  Protonix Iv  IVPUSH   40 mg





  DAILY FARIBA   Administration





     





     





     





     


 


Valsartan  320 mg  08/25/19 10:00  





  Diovan -  PO   





  DAILY FARIBA   





     





     





     





     








Imaging


8/24 CTAP: acute diverticulitis proximal sigmoid with significant stranding, a 

few tiny extraluminal air pockets; no gross abscess





ASSESSMENT/PLAN


59 year-old male with a PMH significant for HTN, GERD, PAMELLA on CPAP. Admitted 

for acute diverticulitis with microperforation.





Acute diverticulitis with microperforation


   --afebrile, WBC 12.2k on admission


   --continue Zosyn (day #2)


   --surgery following


   --ID following





Hypertension


   --BP stable


   --continue valsartan, Bystolic





GERD


   --protonix





PAMELLA


   --uses CPAP at home


   --found using NC last night was sufficient, does not want to bring CPAP from 

home; could not tolerate BIPAP mask 





FEN


   Fluids: D51/2NS@125mL/hr 


   Electrolytes: replete as indicated


   Nutrition: NPO





DVT prophylaxis: subq heparin





Dispo: continues to require inpatient care. Full code.


   


   








   








Visit type





- Emergency Visit


Emergency Visit: Yes


ED Registration Date: 08/24/19


Care time: The patient presented to the Emergency Department on the above date 

and was hospitalized for further evaluation of their emergent condition.





- New Patient


This patient is new to me today: Yes


Date on this admission: 08/26/19





- Critical Care


Critical Care patient: No
Physical Exam: 


SUBJECTIVE: Patient seen and examined at bedside.








OBJECTIVE:





 Vital Signs











 Period  Temp  Pulse  Resp  BP Sys/Velasquez  Pulse Ox


 


 Last 24 Hr  97.6 F-98.8 F    18-18  116-136/58-85  96-99











GENERAL: The patient is awake, alert, and fully oriented, in no acute distress.


LUNGS: Breath sounds equal, clear to auscultation bilaterally, no wheezes, no 

crackles, no accessory muscle use. 


HEART: Regular rate and rhythm, S1, S2 


ABDOMEN: Soft, mild  LLQ and LMQ tenderness, hypoactive bowel sounds no guarding

, no rebound tenderness 


EXTREMITIES: 2+ pulses, warm, well-perfused, no edema. 


NEUROLOGICAL: Cranial nerves II through XII grossly intact. Normal speech, 

steady gait observed











 Laboratory Results - last 24 hr











  08/27/19 08/27/19 08/27/19





  07:16 07:16 07:16


 


WBC  6.1  


 


RBC  4.71  


 


Hgb  14.5  


 


Hct  43.1  


 


MCV  91.4  


 


MCH  30.7  


 


MCHC  33.6  


 


RDW  13.3  


 


Plt Count  219  


 


MPV  9.3  


 


Absolute Neuts (auto)  3.4  


 


Neutrophils %  55.5  


 


Lymphocytes %  29.4  D  


 


Monocytes %  8.6  


 


Eosinophils %  6.0 H  


 


Basophils %  0.5  


 


PT with INR    13.2 H


 


INR    1.18


 


Sodium   140 


 


Potassium   4.6 


 


Chloride   106 


 


Carbon Dioxide   30 


 


Anion Gap   4 L 


 


BUN   9.0 


 


Creatinine   1.1 


 


Est GFR (CKD-EPI)AfAm   83.53 


 


Est GFR (CKD-EPI)NonAf   72.07 


 


Random Glucose   133 H 


 


Calcium   8.5 


 


Magnesium   2.1 


 


Total Bilirubin   0.6 


 


AST   18 


 


ALT   16 


 


Alkaline Phosphatase   39 L 


 


Total Protein   5.9 L 


 


Albumin   3.0 L 








                           Active Medications











Generic Name Dose Route Start Last Admin





  Trade Name Freq  PRN Reason Stop Dose Admin


 


Acetaminophen  1,000 mg  08/25/19 08:50  08/26/19 21:32





  Ofirmev Injection -  IVPB   1,000 mg





  Q6H PRN   Administration





  PAIN OR FEVER   





     





     





     


 


Heparin Sodium (Porcine)  5,000 unit  08/25/19 10:00  08/27/19 10:21





  Heparin -  SQ   5,000 unit





  BID FARIBA   Administration





     





     





     





     


 


Piperacillin Sod/Tazobactam  50 mls @ 100 mls/hr  08/25/19 18:00  08/27/19 10:20





  Sod 3.375 gm/ Dextrose  IVPB   100 mls/hr





  Q8H-IV FARIBA   Administration





     





     





  Protocol   





     


 


Dextrose/Sodium Chloride  1,000 mls @ 75 mls/hr  08/27/19 10:57  





  D5-1/2ns -  IV   





  ASDIR FARIBA   





     





     





     





     


 


Nebivolol  10 mg  08/25/19 10:00  08/27/19 10:19





  Bystolic -  PO   10 mg





  DAILY FARIBA   Administration





     





     





     





     


 


Pantoprazole Sodium  40 mg  08/25/19 10:00  08/27/19 10:20





  Protonix Iv  IVPUSH   40 mg





  DAILY FARIBA   Administration





     





     





     





     


 


Valsartan  320 mg  08/25/19 10:00  08/27/19 10:20





  Diovan -  PO   320 mg





  DAILY FARIBA   Administration





     





     





     





     


 


Zolpidem Tartrate  10 mg  08/26/19 18:45  08/26/19 22:30





  Ambien -  PO   10 mg





  HS PRN   Administration





  INSOMNIA   





     





     





     





Microbiology


08/24/19 19:50   Urine - Urine Clean Catch   Urine Culture - Final


                            NO GROWTH OBTAINED





Imaging


8/24 CTAP: acute diverticulitis proximal sigmoid with significant stranding, a 

few tiny extraluminal air pockets; no gross abscess





Assessment & Plan


59 year-old male with a PMH significant for HTN, GERD, PAMELLA on CPAP. Admitted 

for acute diverticulitis with microperforation.





Acute diverticulitis with microperforation


   --afebrile, leukocytosis resolved


   --continue Zosyn (day #3)


   --repeat CT done, pending dictation


   --surgery following


   --ID following





Hypertension


   --BP stable


   --continue valsartan, Bystolic





GERD


   --protonix





PAMELLA


   --uses CPAP at home


   --using NC at night here, does not tolerate hospital BIPAP mask 





FEN


   Fluids: D51/2NS@ 75mL/hr 


   Electrolytes: replete as indicated


   Nutrition: clears





DVT prophylaxis: subq heparin





Dispo: continues to require inpatient care. Full code.


   


   








Visit type





- Emergency Visit


Emergency Visit: Yes


ED Registration Date: 08/24/19


Care time: The patient presented to the Emergency Department on the above date 

and was hospitalized for further evaluation of their emergent condition.





- New Patient


This patient is new to me today: No





- Critical Care


Critical Care patient: No
Physical Exam: 


SUBJECTIVE: Patient seen and examined oob to chair. No physical complaints. 

Tolerlating clears. Frustrated at having to remain in hospital.








OBJECTIVE:





 Vital Signs











 Period  Temp  Pulse  Resp  BP Sys/Velasquez  Pulse Ox


 


 Last 24 Hr  97.8 F-98.8 F    18-19  103-151/58-93  














GENERAL: The patient is awake, alert, and fully oriented, in no acute distress.


LUNGS: Breath sounds equal, clear to auscultation bilaterally, no wheezes, no 

crackles, no accessory muscle use. 


HEART: Regular rate and rhythm, S1, S2 


ABDOMEN: Soft, not tender, not distended  


EXTREMITIES: 2+ pulses, warm, well-perfused, no edema. 


NEUROLOGICAL: Cranial nerves II through XII grossly intact. Normal speech, 

steady gait observed

















 Laboratory Results - last 24 hr











  08/28/19





  06:53


 


PT with INR  13.5 H


 


INR  1.21








                           Active Medications











Generic Name Dose Route Start Last Admin





  Trade Name Freq  PRN Reason Stop Dose Admin


 


Acetaminophen  1,000 mg  08/25/19 08:50  08/27/19 18:21





  Ofirmev Injection -  IVPB   1,000 mg





  Q6H PRN   Administration





  PAIN OR FEVER   





     





     





     


 


Heparin Sodium (Porcine)  5,000 unit  08/25/19 10:00  08/27/19 21:11





  Heparin -  SQ   5,000 unit





  BID FARIBA   Administration





     





     





     





     


 


Piperacillin Sod/Tazobactam  50 mls @ 100 mls/hr  08/25/19 18:00  08/28/19 02:16





  Sod 3.375 gm/ Dextrose  IVPB   100 mls/hr





  Q8H-IV FARIBA   Administration





     





     





  Protocol   





     


 


Dextrose/Sodium Chloride  1,000 mls @ 75 mls/hr  08/27/19 10:57  08/27/19 11:00





  D5-1/2ns -  IV   75 mls/hr





  ASDIR FARIBA   Administration





     





     





     





     


 


Lorazepam  2 mg  08/27/19 22:23  08/27/19 23:02





  Ativan -  PO   2 mg





  HS PRN   Administration





  ANXIETY   





     





     





     


 


Nebivolol  10 mg  08/25/19 10:00  08/27/19 10:19





  Bystolic -  PO   10 mg





  DAILY FARIBA   Administration





     





     





     





     


 


Pantoprazole Sodium  40 mg  08/25/19 10:00  08/27/19 10:20





  Protonix Iv  IVPUSH   40 mg





  DAILY FARIBA   Administration





     





     





     





     


 


Valsartan  320 mg  08/25/19 10:00  08/27/19 10:20





  Diovan -  PO   320 mg





  DAILY FARIBA   Administration





     





     





     





     





 


Microbiology


08/24/19 19:50   Urine - Urine Clean Catch   Urine Culture - Final


                            NO GROWTH OBTAINED





Imaging


8/24 CTAP: acute diverticulitis proximal sigmoid with significant stranding, a 

few tiny extraluminal air pockets; no gross abscess


8/27 CTAP: minimal improvement; no abscess or drainable collection identified, 

close follow up to r/o early phlegmon formation





Assessment & Plan


59 year-old male with a PMH significant for HTN, GERD, PAMELLA on CPAP. Admitted 

for acute diverticulitis with microperforation.





Acute diverticulitis with microperforation


   --remains afebrile, leukocytosis resolved


   --continue Zosyn (day #4)


   --repeat CT done, pending dictation


   --surgery following


   --ID following





Hypertension


   --BP stable


   --continue valsartan, Bystolic





GERD


   --protonix





PAMELLA


   --uses CPAP at home


   --using NC at night here, does not tolerate hospital BIPAP mask 





FEN


   Fluids: PO intake adequate


   Electrolytes: replete as indicated


   Nutrition: clears





DVT prophylaxis: subq heparin





Dispo: continues to require inpatient care. Full code.


   








Visit type





- Emergency Visit


Emergency Visit: Yes


ED Registration Date: 08/24/19


Care time: The patient presented to the Emergency Department on the above date 

and was hospitalized for further evaluation of their emergent condition.





- New Patient


This patient is new to me today: No





- Critical Care


Critical Care patient: No
Physical Exam: 


SUBJECTIVE: Patient seen and examined oob to chair. Tolerating clears. No pain.








OBJECTIVE:





 Vital Signs











 Period  Temp  Pulse  Resp  BP Sys/Velasquez  Pulse Ox


 


 Last 24 Hr  97.7 F-98.7 F  60-73  16-19  107-137/72-86  








GENERAL: The patient is awake, alert, and fully oriented, in no acute distress.


LUNGS: Breath sounds equal, clear to auscultation bilaterally, no wheezes, no 

crackles, no accessory muscle use. 


HEART: Regular rate and rhythm, S1, S2 


ABDOMEN: Soft, not tender, not distended  


EXTREMITIES: 2+ pulses, warm, well-perfused, no edema. 


NEUROLOGICAL: Cranial nerves II through XII grossly intact. Normal speech, 

steady gait observed











                           Active Medications











Generic Name Dose Route Start Last Admin





  Trade Name Freq  PRN Reason Stop Dose Admin


 


Heparin Sodium (Porcine)  5,000 unit  08/25/19 10:00 08/28/19 21:55





  Heparin -  SQ   5,000 unit





  BID FARIBA   Administration





     





     





     





     


 


Piperacillin Sod/Tazobactam  50 mls @ 100 mls/hr  08/25/19 18:00  08/29/19 02:30





  Sod 3.375 gm/ Dextrose  IVPB   100 mls/hr





  Q8H-IV FARIBA   Administration





     





     





  Protocol   





     


 


Lorazepam  2 mg  08/27/19 22:23  08/28/19 23:25





  Ativan -  PO   2 mg





  HS PRN   Administration





  ANXIETY   





     





     





     


 


Nebivolol  10 mg  08/25/19 10:00  08/28/19 09:59





  Bystolic -  PO   10 mg





  DAILY FARIBA   Administration





     





     





     





     


 


Pantoprazole Sodium  40 mg  08/25/19 10:00 08/28/19 10:02





  Protonix Iv  IVPUSH   40 mg





  DAILY FARIBA   Administration





     





     





     





     


 


Valsartan  320 mg  08/25/19 10:00 08/28/19 10:00





  Diovan -  PO   320 mg





  DAILY FARIBA   Administration





     





     





     





     














Microbiology


08/24/19 19:50   Urine - Urine Clean Catch   Urine Culture - Final


                            NO GROWTH OBTAINED





Imaging


8/24 CTAP: acute diverticulitis proximal sigmoid with significant stranding, a 

few tiny extraluminal air pockets; no gross abscess


8/27 CTAP: minimal improvement; no abscess or drainable collection identified, 

close follow up to r/o early phlegmon formation





Assessment & Plan


59 year-old male with a PMH significant for HTN, GERD, PAMELLA on CPAP. Admitted 

for acute diverticulitis with microperforation.





Acute diverticulitis with microperforation


   --remains afebrile, leukocytosis resolved, clinically much improved


   --continue Zosyn (day #5)


   --plan is to advance diet; if tolerates regular food will discharge tomorrow 

on PO antibiotics; discussed with surgery and ID





Hypertension


   --BP stable


   --continue valsartan, Bystolic





GERD


   --protonix





PAMELLA


   --uses CPAP at home


   --using NC at night here, does not tolerate hospital BIPAP mask 





FEN


   Fluids: PO intake adequate


   Electrolytes: replete as indicated


   Nutrition: full liquids, advance as tolerated





DVT prophylaxis: subq heparin





Dispo: continues to require inpatient care. Full code.


   





Visit type





- Emergency Visit


Emergency Visit: Yes


ED Registration Date: 08/24/19


Care time: The patient presented to the Emergency Department on the above date 

and was hospitalized for further evaluation of their emergent condition.





- New Patient


This patient is new to me today: No





- Critical Care


Critical Care patient: No
Progress Note (short form)





- Note


Progress Note: 





58 yo male admitted proximal sigmoid diverticulitis w/ microperf.


No acute events since admit to hospital per RN notes.


Patient supine in bed. States he feels that the LLQ pain is resolving.


Pain managed well via narcotic and non-narcotic medications.


He is OOB and ambulating unassisted.


Voiding spontaneosuly. A couple of episodes of diarrhea (non-bloody).


Denies n/v/f/c





 Last Vital Signs











Temp Pulse Resp BP Pulse Ox


 


 98.5 F   85   18   113/82   99 


 


 08/26/19 05:00  08/26/19 05:00  08/26/19 05:00  08/26/19 05:00  08/26/19 05:00











 WBC TREND











  08/24/19 08/25/19 08/26/19





  17:50 06:00 06:50


 


WBC  12.2 H  10.4  Pending





Gen: nad


ABD: morbidly obese habitus. Normoactive bowel sounds. LLQ TTP (mild guarding). 

No rigidity.


LLE: soft. nt. bilat.














Problem List





- Problems


(1) Diverticulitis of sigmoid colon


Assessment/Plan: 


58 yo male admitted with sigmoid diverticulitis and microperforation as seen on 

ABD/Pelvis CT.


Of note, on same CT a small hiatal hernia as well as small to moderate left 

inguinal hernia identified. Patient is asymptomatic with regards to the hernias.





Cont NPO and may have ice chips


Bowel rest


IV ABX


OOB and ambulate


Pain management PRN


f/u Labs


Nutrition Consult - educate patient what to avoid


Repeat A/P CT tomorrow or Wednesday


Patient informed about his hernias, instructed that these aren't emergencies 

and can be addressed as out-patient for possible elective repair.


Above plan discussed with my attending and agrees.





Code(s): K57.32 - DVTRCLI OF LG INT W/O PERFORATION OR ABSCESS W/O BLEEDING   





(2) GERD (gastroesophageal reflux disease)


Code(s): K21.9 - GASTRO-ESOPHAGEAL REFLUX DISEASE WITHOUT ESOPHAGITIS   





(3) HTN (hypertension)


Code(s): I10 - ESSENTIAL (PRIMARY) HYPERTENSION   





(4) Obesity, morbid, BMI 40.0-49.9


Code(s): E66.01 - MORBID (SEVERE) OBESITY DUE TO EXCESS CALORIES
Progress Note (short form)





- Note


Progress Note: 





Attending Surgeon





Seen in f/u; less c/o pain; mor localized to the LLQ





VSS AF





abdo-soft; minimal if any LLQ tenderness; no LIH appreciated





IMP: acute diverticulitis





PLAN: Clear liquid diet; continue IVAB's; repeat CT scan a/p.





Al Miles MD FACS
Progress Note (short form)





- Note


Progress Note: 





Attending Surgeon





Seen in f/u; no c/o; tolerating clear liquids





VSS AF





abdo-soft and non tender





WBC-nl





repeat CT reviewed





IMP: improved





PLAN: Advance diet and continue IVABS and d/c home 08/30/19 on PO antibiotics 

to complete 10 days total and office f/u.





Al Miles MD FACS
Progress Note (short form)





- Note


Progress Note: 





Pt states that he is feeling better everyday, tolerated the clears and is 

having bowel movements.





 Vital Signs











 Period  Temp  Pulse  Resp  BP Sys/Velasquez  Pulse Ox


 


 Last 24 Hr  97.8 F-98.8 F    18-19  103-151/58-93  








GEN: A&0x3, NAD


ABD: soft, non-distended, non-tender





 CBC, BMP





 08/27/19 07:16 





 08/27/19 07:16 





CT SCAN:  Minimal interval improvement involving the sigmoid colon inflammation 

with perinephritic inflammation. No collections/abscess. Some free fluid in the 

pelvis.





A/P: 60 yo male with sigmoid diverticulitis, clinically improving with minimal 

change in repeat CT scan finding.


   D/w Dr. Miles and recommend to continue IV abx. 


   Continue clears as tolerated if he continues to improve then may advance his 

diet tomorrow.
Progress Note, Physician


History of Present Illness: 





patient stable


doing well


tolerating liquids





- Current Medication List


Current Medications: 


Active Medications





Heparin Sodium (Porcine) (Heparin -)  5,000 unit SQ BID [facility]


   Last Admin: 08/29/19 23:07 Dose:  5,000 unit


Piperacillin Sod/Tazobactam (Sod 3.375 gm/ Dextrose)  50 mls @ 100 mls/hr IVPB 

Q8H-IV FARIBA; Protocol


   Last Admin: 08/30/19 01:25 Dose:  100 mls/hr


Lorazepam (Ativan -)  2 mg PO HS PRN


   PRN Reason: ANXIETY


   Last Admin: 08/29/19 23:33 Dose:  2 mg


Nebivolol (Bystolic -)  10 mg PO DAILY [facility]


   Last Admin: 08/29/19 09:20 Dose:  10 mg


Pantoprazole Sodium (Protonix Iv)  40 mg IVPUSH DAILY [facility]


   Last Admin: 08/29/19 09:20 Dose:  40 mg


Valsartan (Diovan -)  320 mg PO DAILY [facility]


   Last Admin: 08/29/19 09:20 Dose:  320 mg











- Objective


Vital Signs: 


 Vital Signs











Temperature  97.8 F   08/30/19 06:00


 


Pulse Rate  67   08/30/19 06:00


 


Respiratory Rate  16   08/30/19 06:00


 


Blood Pressure  103/70   08/30/19 06:00


 


O2 Sat by Pulse Oximetry (%)  97   08/30/19 07:01











Constitutional: Yes: No Distress, Calm


Cardiovascular: Yes: Regular Rate and Rhythm


Respiratory: Yes: Regular, CTA Bilaterally


Gastrointestinal: Yes: Normal Bowel Sounds, Soft


Musculoskeletal: Yes: WNL


Extremities: Yes: WNL


Neurological: Yes: Alert, Oriented


Psychiatric: Yes: Alert, Oriented


Labs: 


 CBC, BMP





 08/29/19 06:45 





 08/29/19 06:45 





 INR, PTT











INR  1.21  (0.82-1.09)   08/28/19  06:53    














Assessment/Plan





this obese patient coming to the hospital because of persistent abd pain 

because of diverticulitis who failed outpatient treatment and with microperf now





abd pain 


diverticulitis


obesity





plan


continue iv abx


rest as per the team and surgery


ct scan seen and results noted


tolerating po liquids
Progress Note, Physician


History of Present Illness: 





stable


continues to improve





- Current Medication List


Current Medications: 


Active Medications





Acetaminophen (Ofirmev Injection -)  1,000 mg IVPB Q6H PRN


   PRN Reason: PAIN OR FEVER


   Last Admin: 08/27/19 18:21 Dose:  1,000 mg


Heparin Sodium (Porcine) (Heparin -)  5,000 unit SQ BID [facility]


   Last Admin: 08/28/19 10:00 Dose:  5,000 unit


Piperacillin Sod/Tazobactam (Sod 3.375 gm/ Dextrose)  50 mls @ 100 mls/hr IVPB 

Q8H-IV FARIBA; Protocol


   Last Admin: 08/28/19 19:15 Dose:  100 mls/hr


Lorazepam (Ativan -)  2 mg PO HS PRN


   PRN Reason: ANXIETY


   Last Admin: 08/27/19 23:02 Dose:  2 mg


Nebivolol (Bystolic -)  10 mg PO DAILY [facility]


   Last Admin: 08/28/19 09:59 Dose:  10 mg


Pantoprazole Sodium (Protonix Iv)  40 mg IVPUSH DAILY [facility]


   Last Admin: 08/28/19 10:02 Dose:  40 mg


Valsartan (Diovan -)  320 mg PO DAILY [facility]


   Last Admin: 08/28/19 10:00 Dose:  320 mg











- Objective


Vital Signs: 


 Vital Signs











Temperature  97.9 F   08/28/19 14:00


 


Pulse Rate  64   08/28/19 14:00


 


Respiratory Rate  19   08/28/19 14:00


 


Blood Pressure  118/86   08/28/19 14:00


 


O2 Sat by Pulse Oximetry (%)  97   08/28/19 14:00











Constitutional: Yes: No Distress, Calm


Cardiovascular: Yes: S1, S2


Respiratory: Yes: Regular, CTA Bilaterally


Gastrointestinal: Yes: Soft, Hypoactive Bowel Sounds


Musculoskeletal: Yes: WNL


Extremities: Yes: WNL


Neurological: Yes: Alert, Oriented


Psychiatric: Yes: Alert, Oriented


Labs: 


 CBC, BMP





 08/27/19 07:16 





 08/27/19 07:16 





 INR, PTT











INR  1.21  (0.82-1.09)   08/28/19  06:53    














Assessment/Plan





this obese patient coming to the hospital because of persistent abd pain 

because of diverticulitis who failed outpatient treatment and with microperf now





abd pain 


diverticulitis


obesity





plan


continue iv abx


rest as per the team and surgery


ct scan seen and results noted


tolerating po liquids
Progress Note, Physician


History of Present Illness: 





stable


doing well


c/o of dry cough





- Current Medication List


Current Medications: 


Active Medications





Heparin Sodium (Porcine) (Heparin -)  5,000 unit SQ BID [facility]


   Last Admin: 08/29/19 23:07 Dose:  5,000 unit


Piperacillin Sod/Tazobactam (Sod 3.375 gm/ Dextrose)  50 mls @ 100 mls/hr IVPB 

Q8H-IV [facility]; Protocol


   Last Admin: 08/30/19 01:25 Dose:  100 mls/hr


Lorazepam (Ativan -)  2 mg PO HS PRN


   PRN Reason: ANXIETY


   Last Admin: 08/29/19 23:33 Dose:  2 mg


Nebivolol (Bystolic -)  10 mg PO DAILY [facility]


   Last Admin: 08/29/19 09:20 Dose:  10 mg


Pantoprazole Sodium (Protonix Iv)  40 mg IVPUSH DAILY [facility]


   Last Admin: 08/29/19 09:20 Dose:  40 mg


Valsartan (Diovan -)  320 mg PO DAILY [facility]


   Last Admin: 08/29/19 09:20 Dose:  320 mg











- Objective


Vital Signs: 


 Vital Signs











Temperature  97.8 F   08/30/19 06:00


 


Pulse Rate  67   08/30/19 06:00


 


Respiratory Rate  16   08/30/19 06:00


 


Blood Pressure  103/70   08/30/19 06:00


 


O2 Sat by Pulse Oximetry (%)  97   08/30/19 07:01











Constitutional: Yes: No Distress, Calm


Cardiovascular: Yes: S1, S2


Respiratory: Yes: Regular, CTA Bilaterally


Gastrointestinal: Yes: Normal Bowel Sounds, Soft


Musculoskeletal: Yes: WNL


Extremities: Yes: WNL


Neurological: Yes: Alert, Oriented


Labs: 


 CBC, BMP





 08/29/19 06:45 





 08/29/19 06:45 





 INR, PTT











INR  1.21  (0.82-1.09)   08/28/19  06:53    














Assessment/Plan





this obese patient coming to the hospital because of persistent abd pain 

because of diverticulitis who failed outpatient treatment and with microperf now





abd pain 


diverticulitis


obesity





plan


when patient ready can change to augmentin


advised about diet


rest as per the team
Progress Note, Physician


History of Present Illness: 





stable


pain better





- Current Medication List


Current Medications: 


Active Medications





Acetaminophen (Ofirmev Injection -)  1,000 mg IVPB Q6H PRN


   PRN Reason: PAIN OR FEVER


   Last Admin: 08/27/19 18:21 Dose:  1,000 mg


Heparin Sodium (Porcine) (Heparin -)  5,000 unit SQ BID [facility]


   Last Admin: 08/28/19 10:00 Dose:  5,000 unit


Piperacillin Sod/Tazobactam (Sod 3.375 gm/ Dextrose)  50 mls @ 100 mls/hr IVPB 

Q8H-IV [facility]; Protocol


   Last Admin: 08/28/19 19:15 Dose:  100 mls/hr


Lorazepam (Ativan -)  2 mg PO HS PRN


   PRN Reason: ANXIETY


   Last Admin: 08/27/19 23:02 Dose:  2 mg


Nebivolol (Bystolic -)  10 mg PO DAILY [facility]


   Last Admin: 08/28/19 09:59 Dose:  10 mg


Pantoprazole Sodium (Protonix Iv)  40 mg IVPUSH DAILY [facility]


   Last Admin: 08/28/19 10:02 Dose:  40 mg


Valsartan (Diovan -)  320 mg PO DAILY [facility]


   Last Admin: 08/28/19 10:00 Dose:  320 mg











- Objective


Vital Signs: 


 Vital Signs











Temperature  97.9 F   08/28/19 14:00


 


Pulse Rate  64   08/28/19 14:00


 


Respiratory Rate  19   08/28/19 14:00


 


Blood Pressure  118/86   08/28/19 14:00


 


O2 Sat by Pulse Oximetry (%)  97   08/28/19 14:00











Constitutional: Yes: No Distress, Calm


Cardiovascular: Yes: S1, S2


Respiratory: Yes: Regular, CTA Bilaterally


Gastrointestinal: Yes: Normal Bowel Sounds, Soft


Musculoskeletal: Yes: WNL


Extremities: Yes: WNL


Neurological: Yes: Alert, Oriented


Psychiatric: Yes: Alert, Oriented


Labs: 


 CBC, BMP





 08/27/19 07:16 





 08/27/19 07:16 





 INR, PTT











INR  1.21  (0.82-1.09)   08/28/19  06:53    














Assessment/Plan





this obese patient coming to the hospital because of persistent abd pain 

because of diverticulitis who failed outpatient treatment and with microperf now





abd pain 


diverticulitis


obesity





plan


abx


iv fluids


rest as per surgery


and the team





patient had repeat ct scan will await for the results
Progress Note, Physician


History of Present Illness: 





still with tenderness of the abd


improving





- Current Medication List


Current Medications: 


Active Medications





Acetaminophen (Ofirmev Injection -)  1,000 mg IVPB Q6H PRN


   PRN Reason: PAIN OR FEVER


   Last Admin: 08/26/19 08:26 Dose:  1,000 mg


Cholecalciferol (Vitamin D3 -)  2,000 unit PO DAILY [facility]


Heparin Sodium (Porcine) (Heparin -)  5,000 unit SQ BID [facility]


   Last Admin: 08/26/19 10:42 Dose:  5,000 unit


Sodium Chloride (Normal Saline -)  1,000 mls @ 150 mls/hr IV ASDIR [facility]


   Last Admin: 08/26/19 08:27 Dose:  150 mls/hr


Piperacillin Sod/Tazobactam (Sod 3.375 gm/ Dextrose)  50 mls @ 100 mls/hr IVPB 

Q8H-IV [facility]; Protocol


   Last Admin: 08/26/19 09:42 Dose:  100 mls/hr


Nebivolol (Bystolic -)  10 mg PO DAILY [facility]


Pantoprazole Sodium (Protonix Iv)  40 mg IVPUSH DAILY [facility]


   Last Admin: 08/26/19 10:20 Dose:  40 mg


Valsartan (Diovan -)  320 mg PO DAILY [facility]











- Objective


Vital Signs: 


 Vital Signs











Temperature  97.8 F   08/26/19 09:00


 


Pulse Rate  78   08/26/19 09:00


 


Respiratory Rate  18   08/26/19 09:00


 


Blood Pressure  108/74   08/26/19 09:00


 


O2 Sat by Pulse Oximetry (%)  95   08/26/19 09:00











Constitutional: Yes: Calm, Mild Distress, Obese


Cardiovascular: Yes: Regular Rate and Rhythm


Respiratory: Yes: Regular, CTA Bilaterally


Gastrointestinal: Yes: Soft, Hypoactive Bowel Sounds, Tenderness


Musculoskeletal: Yes: WNL


Extremities: Yes: WNL


Neurological: Yes: Alert, Oriented


Psychiatric: Yes: Alert, Oriented


Labs: 


 CBC, BMP





 08/26/19 06:50 





 08/26/19 06:50 





 INR, PTT











INR  1.27  (0.82-1.09)  H  08/26/19  06:50    














Assessment/Plan





this obese patient coming to the hospital because of persistent abd pain 

because of diverticulitis who failed outpatient treatment and with microperf now





abd pain 


diverticulitis


obesity





plan


abx


iv fluids


rest as per surgery


and the team
English

## 2019-11-12 NOTE — HP
Satellite PMH





- Chief Complaint


Chief Complaint: right cts





- Past Medical History


Allergies/Adverse Reactions: 


 Allergies











Allergy/AdvReac Type Severity Reaction Status Date / Time


 


No Known Drug Allergies Allergy   Verified 08/24/19 17:16











Cardiovascular: Yes: HTN


Gastrointestinal: Yes: GERD





- Current Medications


Current Medications: 


 Home Medications











 Medication  Instructions  Recorded


 


Cholecalciferol (Vitamin D3) 2,000 unit PO DAILY 04/24/17





[Vitamin D3]  


 


Lansoprazole [Prevacid] 30 mg PO DAILY 04/24/17


 


Multivitamins [Multivit ([facility] 1 tab PO DAILY 04/24/17





Formulary)]  


 


Nebivolol HCl [Bystolic] 10 mg PO DAILY 04/24/17


 


traMADol HCL [Ultram -] 50 mg PO Q4H PRN 04/24/17


 


Valsartan 320 mg PO DAILY 08/24/19


 


Duloxetine HCl 30 mg PO DAILY 11/12/19














Satellite Physical Exam





- Physical Examination


General Appearance: Well Nourished, Well Developed, Alert & Oriented x3


ENT: Clear


Lung: Normal air movement


Extremities: Other (right hand- + tinels, + phalens, emg + cts)


Neurological: Intact, Alert, Oriented





Satellite Impression/Plan





- Impression/Plan


Impression: right cts


Operative Procedure: right ctr


Date to be Performed: 11/12/19

## 2019-11-13 ENCOUNTER — HOSPITAL ENCOUNTER (OUTPATIENT)
Dept: HOSPITAL 74 - JASU-SURG | Age: 59
Discharge: HOME | End: 2019-11-13
Attending: ORTHOPAEDIC SURGERY
Payer: COMMERCIAL

## 2019-11-13 VITALS — SYSTOLIC BLOOD PRESSURE: 126 MMHG | DIASTOLIC BLOOD PRESSURE: 76 MMHG | TEMPERATURE: 97.6 F | HEART RATE: 73 BPM

## 2019-11-13 VITALS — BODY MASS INDEX: 43.4 KG/M2

## 2019-11-13 DIAGNOSIS — I10: ICD-10-CM

## 2019-11-13 DIAGNOSIS — G56.01: Primary | ICD-10-CM

## 2019-11-13 DIAGNOSIS — M65.831: ICD-10-CM

## 2019-11-13 DIAGNOSIS — G47.30: ICD-10-CM

## 2019-11-13 PROCEDURE — 01N50ZZ RELEASE MEDIAN NERVE, OPEN APPROACH: ICD-10-PCS | Performed by: ORTHOPAEDIC SURGERY

## 2019-11-13 NOTE — OP
Operative Note





- Note:


Operative Date: 11/13/19


Pre-Operative Diagnosis: right CTS


Operation: right CTR, tenosynovectomy


Post-Operative Diagnosis: Same as Pre-op


Surgeon: Anup Welch


Anesthesiologist/CRNA: Leventhal,Joshua


Anesthesia: Local, MAC


Specimens Removed: tenosynovium


Estimated Blood Loss (mls): 0


Drains, Volume Out (mls): 0


Blood Volume Replaced (mls): 0


Fluid Volume Replaced (mls): 700


Operative Report Dictated: Yes

## 2019-11-13 NOTE — SPEC
DATE OF OPERATION:  11/13/2019

 

PREOPERATIVE DIAGNOSIS:  Right carpal tunnel syndrome and tenosynovitis.

 

POSTOPERATIVE DIAGNOSIS:  Right carpal tunnel syndrome and tenosynovitis.

 

OPERATION:  Right carpal tunnel release and tenosynovectomy.

 

SURGEON:  Anup Welch M.D.

 

ASSISTANTS:  None 

 

ANESTHESIA:  MAC, local injection with 20 mL of 0.5% Marcaine and 1% Lidocaine mix.

 

ANESTHESIOLOGIST:  _____ CRNA

 

DRAINS:  None.

 

COMPLICATIONS:  None.

 

SPECIMENS:  Tenosynovium, right wrist.

 

BLOOD LOSS:  None.

 

BLOOD GIVEN:  None.  

 

FLUID REPLACEMENT:  700 mL of Plasmalyte.

 

TOTAL TOURNIQUET TIME:  22 minutes.

 

INDICATIONS:  The patient is a 59-year-old male with a preoperative diagnosis of

severe right carpal tunnel syndrome.  After understanding the potential risks,

complications, alternatives and benefits of surgery versus nonsurgical treatment, the

patient elected to undergo this procedure.  He understands he may not get complete

relief of his symptoms including continuation of the numbness.  

 

DESCRIPTION OF PROCEDURE:  The patient was brought to the operating room, peripheral

IV placed and intravenous sedation was given.  One gram of intravenous Ancef was

given.  MAC anesthesia was induced.  A tourniquet was applied to the right upper arm

and the right upper extremity was prepped and draped in sterile fashion.

 

The entire case was done under 3.8 loupe magnification.  A marking pen was utilized

to marshall out a longitudinal incision in an already existing skin crease. Twenty mL of

0.5% Marcaine mixed with 1% Lidocaine was injected in and around the surgical

incision.  The right upper extremity was elevated, exsanguinated with an Esmarch

bandage and the tourniquet inflated to 250 mmHg. 

 

A No. 15 scalpel blade was utilized to cut down through the skin.  Subcutaneous

hemostasis was achieved with the bipolar cautery.  Dissection was done through the

superficial palmar fascia.  Self-retaining retractors were placed into the wound. 

Under direct visualization, the transverse carpal ligament was transected with a No.

15 scalpel blade, exposing the median nerve and the contents of the carpal tunnel.

 

The distal and proximal extents of the release were completed with a Littler scissor

and checked with irrigation and my small finger.  They were seen to be complete. 

Limited dissection was done on the radial side of the median nerve and more extensive

dissection was done on the ulnar side of the median nerve.  The patients nerve was

seen to be quite compressed by epineurium and therefore a limited epineurotomy was

performed.

 

A Ragnell retractor was used to gently retract the median nerve in a radial

direction.  The patient had a lot of tenosynovitis and therefore a tenosynovectomy

was performed off all 9 flexor tendons.  This was passed off the field as

tenosynovium right wrist.

 

The floor of the carpal tunnel was checked.  There were no abnormal masses or

ganglion cysts.  The area was copiously irrigated and washed out and closure begun.

 

Undyed 4-0 Vicryl was used to close the deep dermal layer.  Final skin

reapproximation was done with horizontal mattress 4-0 nylon sutures.  The area was

then washed and dried, covered with Xeroform, 4x4s, fluffs between the fingers,

Webril and a 4-inch plaster roll was utilized to make a volar splint, which was then

wrapped with Sunday and Coban.

 

The tourniquet was taken down after a total tourniquet time of 22 minutes.  There

were no complications during the case.  The patient tolerated the procedure well and

was brought to the ambulatory recovery room in stable condition.

 

 

JANET [id number]

DD: 11/13/2019 09:04

DT: 11/13/2019 18:17

Job #:  [id number]

## 2019-11-19 NOTE — PATH
Surgical Pathology Report



Patient Name:  IVET MACIAS

Accession #:  [id number]

Med. Rec. #:  [id number]                                                        

   /Age/Gender:  1960 (Age: 59) / M

Account:  [id number]                                                          

             Location: [facility]

Taken:  2019

Received:  2019

Reported:  2019

Physicians:  Anup Welch M.D.

  



Specimen(s) Received

 TENOSYNOVIUM 





Clinical History

Right carpal tunnel







Final Diagnosis

TENOSYNOVIUM RELEASE:

TENOSYNOVIUM.





***Electronically Signed***

Jenny Lowe M.D.





Gross Description

Received in formalin labeled "tenosynovium," is a 2.2 x 1.5 x 0.3 cm aggregate

of tan-yellow portions of soft tissue, consistent with tenosynovium. The

specimen is entirely submitted in one cassette.

## 2023-10-20 ENCOUNTER — APPOINTMENT (RX ONLY)
Dept: URBAN - METROPOLITAN AREA CLINIC 104 | Facility: CLINIC | Age: 63
Setting detail: DERMATOLOGY
End: 2023-10-20

## 2023-10-20 DIAGNOSIS — L73.8 OTHER SPECIFIED FOLLICULAR DISORDERS: ICD-10-CM

## 2023-10-20 DIAGNOSIS — L72.8 OTHER FOLLICULAR CYSTS OF THE SKIN AND SUBCUTANEOUS TISSUE: ICD-10-CM

## 2023-10-20 PROCEDURE — ? CONSULTATION EXCISION

## 2023-10-20 PROCEDURE — ? ADDITIONAL NOTES

## 2023-10-20 PROCEDURE — ? COUNSELING

## 2023-10-20 PROCEDURE — 99203 OFFICE O/P NEW LOW 30 MIN: CPT

## 2023-10-20 ASSESSMENT — LOCATION ZONE DERM: LOCATION ZONE: TRUNK

## 2023-10-20 ASSESSMENT — LOCATION SIMPLE DESCRIPTION DERM
LOCATION SIMPLE: UPPER BACK
LOCATION SIMPLE: RIGHT UPPER BACK

## 2023-10-20 ASSESSMENT — LOCATION DETAILED DESCRIPTION DERM
LOCATION DETAILED: RIGHT SUPERIOR UPPER BACK
LOCATION DETAILED: INFERIOR THORACIC SPINE

## 2023-10-20 NOTE — PROCEDURE: ADDITIONAL NOTES
Render Risk Assessment In Note?: no
Detail Level: Simple
Additional Notes: Punch bx at the same time of exc

## 2023-11-21 ENCOUNTER — APPOINTMENT (RX ONLY)
Dept: URBAN - METROPOLITAN AREA CLINIC 104 | Facility: CLINIC | Age: 63
Setting detail: DERMATOLOGY
End: 2023-11-21

## 2023-11-21 DIAGNOSIS — L21.8 OTHER SEBORRHEIC DERMATITIS: ICD-10-CM

## 2023-11-21 DIAGNOSIS — L70.0 ACNE VULGARIS: ICD-10-CM | Status: WORSENING

## 2023-11-21 DIAGNOSIS — L73.9 FOLLICULAR DISORDER, UNSPECIFIED: ICD-10-CM

## 2023-11-21 DIAGNOSIS — L738 OTHER SPECIFIED DISEASES OF HAIR AND HAIR FOLLICLES: ICD-10-CM

## 2023-11-21 DIAGNOSIS — L663 OTHER SPECIFIED DISEASES OF HAIR AND HAIR FOLLICLES: ICD-10-CM

## 2023-11-21 DIAGNOSIS — L82.1 OTHER SEBORRHEIC KERATOSIS: ICD-10-CM

## 2023-11-21 PROBLEM — L02.222 FURUNCLE OF BACK [ANY PART, EXCEPT BUTTOCK]: Status: ACTIVE | Noted: 2023-11-21

## 2023-11-21 PROCEDURE — ? COUNSELING

## 2023-11-21 PROCEDURE — ? PRESCRIPTION

## 2023-11-21 PROCEDURE — 99214 OFFICE O/P EST MOD 30 MIN: CPT

## 2023-11-21 RX ORDER — CLINDAMYCIN PHOSPHATE 10 MG/ML
LOTION TOPICAL
Qty: 60 | Refills: 4 | Status: ERX | COMMUNITY
Start: 2023-11-21

## 2023-11-21 RX ORDER — FLUOCINONIDE 0.5 MG/ML
SOLUTION TOPICAL
Qty: 60 | Refills: 3 | Status: ERX | COMMUNITY
Start: 2023-11-21

## 2023-11-21 RX ORDER — TRETIONIN 1 MG/G
CREAM TOPICAL
Qty: 45 | Refills: 3 | Status: ERX | COMMUNITY
Start: 2023-11-21

## 2023-11-21 RX ADMIN — TRETIONIN: 1 CREAM TOPICAL at 00:00

## 2023-11-21 RX ADMIN — FLUOCINONIDE: 0.5 SOLUTION TOPICAL at 00:00

## 2023-11-21 RX ADMIN — CLINDAMYCIN PHOSPHATE: 10 LOTION TOPICAL at 00:00

## 2023-11-21 ASSESSMENT — LOCATION DETAILED DESCRIPTION DERM
LOCATION DETAILED: RIGHT MEDIAL DISTAL PRETIBIAL REGION
LOCATION DETAILED: LEFT MEDIAL FOREHEAD
LOCATION DETAILED: NASAL TIP
LOCATION DETAILED: LEFT CRUS OF HELIX
LOCATION DETAILED: RIGHT CAVUM CONCHA
LOCATION DETAILED: SUPERIOR THORACIC SPINE
LOCATION DETAILED: RIGHT SUPERIOR MEDIAL UPPER BACK
LOCATION DETAILED: EPIGASTRIC SKIN
LOCATION DETAILED: LEFT PROXIMAL PRETIBIAL REGION
LOCATION DETAILED: NASAL SUPRATIP
LOCATION DETAILED: RIGHT PROXIMAL PRETIBIAL REGION

## 2023-11-21 ASSESSMENT — LOCATION SIMPLE DESCRIPTION DERM
LOCATION SIMPLE: NOSE
LOCATION SIMPLE: RIGHT EAR
LOCATION SIMPLE: LEFT PRETIBIAL REGION
LOCATION SIMPLE: RIGHT UPPER BACK
LOCATION SIMPLE: LEFT EAR
LOCATION SIMPLE: RIGHT PRETIBIAL REGION
LOCATION SIMPLE: ABDOMEN
LOCATION SIMPLE: LEFT FOREHEAD
LOCATION SIMPLE: UPPER BACK

## 2023-11-21 ASSESSMENT — LOCATION ZONE DERM
LOCATION ZONE: NOSE
LOCATION ZONE: EAR
LOCATION ZONE: LEG
LOCATION ZONE: FACE
LOCATION ZONE: TRUNK

## 2023-11-21 NOTE — PROCEDURE: COUNSELING
Detail Level: Simple
Birth Control Pills Pregnancy And Lactation Text: This medication should be avoided if pregnant and for the first 30 days post-partum.
Isotretinoin Counseling: Patient should get monthly blood tests, not donate blood, not drive at night if vision affected, not share medication, and not undergo elective surgery for 6 months after tx completed. Side effects reviewed, pt to contact office should one occur.
Spironolactone Pregnancy And Lactation Text: This medication can cause feminization of the male fetus and should be avoided during pregnancy. The active metabolite is also found in breast milk.
Benzoyl Peroxide Counseling: Patient counseled that medicine may cause skin irritation and bleach clothing. In the event of skin irritation, the patient was advised to reduce the amount of the drug applied or use it less frequently. The patient verbalized understanding of the proper use and possible adverse effects of benzoyl peroxide. All of the patient's questions and concerns were addressed.
High Dose Vitamin A Counseling: Side effects reviewed, pt to contact office should one occur.
Azelaic Acid Counseling: Patient counseled that medicine may cause skin irritation and to avoid applying near the eyes. In the event of skin irritation, the patient was advised to reduce the amount of the drug applied or use it less frequently. The patient verbalized understanding of the proper use and possible adverse effects of azelaic acid. All of the patient's questions and concerns were addressed.
Tazorac Pregnancy And Lactation Text: This medication is not safe during pregnancy. It is unknown if this medication is excreted in breast milk.
Bactrim Pregnancy And Lactation Text: This medication is Pregnancy Category D and is known to cause fetal risk. It is also excreted in breast milk.
Topical Clindamycin Pregnancy And Lactation Text: This medication is Pregnancy Category B and is considered safe during pregnancy. It is unknown if it is excreted in breast milk.
Sarecycline Pregnancy And Lactation Text: This medication is Pregnancy Category D and not consider safe during pregnancy. It is also excreted in breast milk.
Azithromycin Pregnancy And Lactation Text: This medication is considered safe during pregnancy and is also secreted in breast milk.
Doxycycline Counseling:  Patient counseled regarding possible photosensitivity and increased risk for sunburn. Patient instructed to avoid sunlight, if possible. When exposed to sunlight, patients should wear protective clothing, sunglasses, and sunscreen. The patient was instructed to call the office immediately if the following severe adverse effects occur:  hearing changes, easy bruising/bleeding, severe headache, or vision changes. The patient verbalized understanding of the proper use and possible adverse effects of doxycycline. All of the patient's questions and concerns were addressed.
Erythromycin Counseling:  I discussed with the patient the risks of erythromycin including but not limited to GI upset, allergic reaction, drug rash, diarrhea, increase in liver enzymes, and yeast infections.
Include Pregnancy/Lactation Warning?: No
Tetracycline Counseling: Patient counseled regarding possible photosensitivity and increased risk for sunburn. Patient instructed to avoid sunlight, if possible. When exposed to sunlight, patients should wear protective clothing, sunglasses, and sunscreen. The patient was instructed to call the office immediately if the following severe adverse effects occur:  hearing changes, easy bruising/bleeding, severe headache, or vision changes. The patient verbalized understanding of the proper use and possible adverse effects of tetracycline. All of the patient's questions and concerns were addressed. Patient understands to avoid pregnancy while on therapy due to potential birth defects.
High Dose Vitamin A Pregnancy And Lactation Text: High dose vitamin A therapy is contraindicated during pregnancy and breast feeding.
Topical Retinoid Pregnancy And Lactation Text: This medication is Pregnancy Category C. It is unknown if this medication is excreted in breast milk.
Winlevi Counseling:  I discussed with the patient the risks of topical clascoterone including but not limited to erythema, scaling, itching, and stinging. Patient voiced their understanding.
Aklief counseling:  Patient advised to apply a pea-sized amount only at bedtime and wait 30 minutes after washing their face before applying. If too drying, patient may add a non-comedogenic moisturizer. The most commonly reported side effects including irritation, redness, scaling, dryness, stinging, burning, itching, and increased risk of sunburn. The patient verbalized understanding of the proper use and possible adverse effects of retinoids. All of the patient's questions and concerns were addressed.
Birth Control Pills Counseling: Birth Control Pill Counseling: I discussed with the patient the potential side effects of OCPs including but not limited to increased risk of stroke, heart attack, thrombophlebitis, deep venous thrombosis, hepatic adenomas, breast changes, GI upset, headaches, and depression. The patient verbalized understanding of the proper use and possible adverse effects of OCPs. All of the patient's questions and concerns were addressed.
Benzoyl Peroxide Pregnancy And Lactation Text: This medication is Pregnancy Category C. It is unknown if benzoyl peroxide is excreted in breast milk.
Topical Sulfur Applications Counseling: Topical Sulfur Counseling: Patient counseled that this medication may cause skin irritation or allergic reactions. In the event of skin irritation, the patient was advised to reduce the amount of the drug applied or use it less frequently. The patient verbalized understanding of the proper use and possible adverse effects of topical sulfur application. All of the patient's questions and concerns were addressed.
Dapsone Counseling: I discussed with the patient the risks of dapsone including but not limited to hemolytic anemia, agranulocytosis, rashes, methemoglobinemia, kidney failure, peripheral neuropathy, headaches, GI upset, and liver toxicity. Patients who start dapsone require monitoring including baseline LFTs and weekly CBCs for the first month, then every month thereafter. The patient verbalized understanding of the proper use and possible adverse effects of dapsone. All of the patient's questions and concerns were addressed.
Isotretinoin Pregnancy And Lactation Text: This medication is Pregnancy Category X and is considered extremely dangerous during pregnancy. It is unknown if it is excreted in breast milk.
Sarecycline Counseling: Patient advised regarding possible photosensitivity and discoloration of the teeth, skin, lips, tongue and gums. Patient instructed to avoid sunlight, if possible. When exposed to sunlight, patients should wear protective clothing, sunglasses, and sunscreen. The patient was instructed to call the office immediately if the following severe adverse effects occur:  hearing changes, easy bruising/bleeding, severe headache, or vision changes. The patient verbalized understanding of the proper use and possible adverse effects of sarecycline. All of the patient's questions and concerns were addressed.
Detail Level: Detailed
Erythromycin Pregnancy And Lactation Text: This medication is Pregnancy Category B and is considered safe during pregnancy. It is also excreted in breast milk.
Spironolactone Counseling: Patient advised regarding risks of diarrhea, abdominal pain, hyperkalemia, birth defects (for female patients), liver toxicity and renal toxicity. The patient may need blood work to monitor liver and kidney function and potassium levels while on therapy. The patient verbalized understanding of the proper use and possible adverse effects of spironolactone. All of the patient's questions and concerns were addressed.
Azelaic Acid Pregnancy And Lactation Text: This medication is considered safe during pregnancy and breast feeding.
Aklief Pregnancy And Lactation Text: It is unknown if this medication is safe to use during pregnancy. It is unknown if this medication is excreted in breast milk. Breastfeeding women should use the topical cream on the smallest area of the skin for the shortest time needed while breastfeeding. Do not apply to nipple and areola.
Tazorac Counseling:  Patient advised that medication is irritating and drying. Patient may need to apply sparingly and wash off after an hour before eventually leaving it on overnight. The patient verbalized understanding of the proper use and possible adverse effects of tazorac. All of the patient's questions and concerns were addressed.
Bactrim Counseling:  I discussed with the patient the risks of sulfa antibiotics including but not limited to GI upset, allergic reaction, drug rash, diarrhea, dizziness, photosensitivity, and yeast infections. Rarely, more serious reactions can occur including but not limited to aplastic anemia, agranulocytosis, methemoglobinemia, blood dyscrasias, liver or kidney failure, lung infiltrates or desquamative/blistering drug rashes.
Doxycycline Pregnancy And Lactation Text: This medication is Pregnancy Category D and not consider safe during pregnancy. It is also excreted in breast milk but is considered safe for shorter treatment courses.
Topical Clindamycin Counseling: Patient counseled that this medication may cause skin irritation or allergic reactions. In the event of skin irritation, the patient was advised to reduce the amount of the drug applied or use it less frequently. The patient verbalized understanding of the proper use and possible adverse effects of clindamycin. All of the patient's questions and concerns were addressed.
Topical Sulfur Applications Pregnancy And Lactation Text: This medication is Pregnancy Category C and has an unknown safety profile during pregnancy. It is unknown if this topical medication is excreted in breast milk.
Topical Retinoid counseling:  Patient advised to apply a pea-sized amount only at bedtime and wait 30 minutes after washing their face before applying. If too drying, patient may add a non-comedogenic moisturizer. The patient verbalized understanding of the proper use and possible adverse effects of retinoids. All of the patient's questions and concerns were addressed.
Dapsone Pregnancy And Lactation Text: This medication is Pregnancy Category C and is not considered safe during pregnancy or breast feeding.
Azithromycin Counseling:  I discussed with the patient the risks of azithromycin including but not limited to GI upset, allergic reaction, drug rash, diarrhea, and yeast infections.
Minocycline Counseling: Patient advised regarding possible photosensitivity and discoloration of the teeth, skin, lips, tongue and gums. Patient instructed to avoid sunlight, if possible. When exposed to sunlight, patients should wear protective clothing, sunglasses, and sunscreen. The patient was instructed to call the office immediately if the following severe adverse effects occur:  hearing changes, easy bruising/bleeding, severe headache, or vision changes. The patient verbalized understanding of the proper use and possible adverse effects of minocycline. All of the patient's questions and concerns were addressed.
Winlevi Pregnancy And Lactation Text: This medication is considered safe during pregnancy and breastfeeding.

## 2023-11-22 ENCOUNTER — RX ONLY (OUTPATIENT)
Age: 63
Setting detail: RX ONLY
End: 2023-11-22

## 2023-11-22 RX ORDER — TRETIONIN 1 MG/G
CREAM TOPICAL QHS
Qty: 45 | Refills: 3 | Status: ERX

## 2024-04-02 ENCOUNTER — APPOINTMENT (RX ONLY)
Dept: URBAN - METROPOLITAN AREA CLINIC 104 | Facility: CLINIC | Age: 64
Setting detail: DERMATOLOGY
End: 2024-04-02

## 2024-04-02 DIAGNOSIS — L57.8 OTHER SKIN CHANGES DUE TO CHRONIC EXPOSURE TO NONIONIZING RADIATION: ICD-10-CM

## 2024-04-02 DIAGNOSIS — L73.8 OTHER SPECIFIED FOLLICULAR DISORDERS: ICD-10-CM

## 2024-04-02 PROBLEM — D23.71 OTHER BENIGN NEOPLASM OF SKIN OF RIGHT LOWER LIMB, INCLUDING HIP: Status: ACTIVE | Noted: 2024-04-02

## 2024-04-02 PROCEDURE — 99213 OFFICE O/P EST LOW 20 MIN: CPT

## 2024-04-02 PROCEDURE — ? COUNSELING

## 2024-04-02 ASSESSMENT — LOCATION DETAILED DESCRIPTION DERM
LOCATION DETAILED: LEFT MEDIAL FOREHEAD
LOCATION DETAILED: SUPERIOR MID FOREHEAD
LOCATION DETAILED: RIGHT FOREHEAD
LOCATION DETAILED: RIGHT SUPERIOR FOREHEAD

## 2024-04-02 ASSESSMENT — LOCATION SIMPLE DESCRIPTION DERM
LOCATION SIMPLE: RIGHT FOREHEAD
LOCATION SIMPLE: SUPERIOR FOREHEAD
LOCATION SIMPLE: LEFT FOREHEAD

## 2024-04-02 ASSESSMENT — LOCATION ZONE DERM: LOCATION ZONE: FACE

## 2024-04-09 ENCOUNTER — APPOINTMENT (RX ONLY)
Dept: URBAN - METROPOLITAN AREA CLINIC 95 | Facility: CLINIC | Age: 64
Setting detail: DERMATOLOGY
End: 2024-04-09

## 2024-04-09 DIAGNOSIS — Z41.9 ENCOUNTER FOR PROCEDURE FOR PURPOSES OTHER THAN REMEDYING HEALTH STATE, UNSPECIFIED: ICD-10-CM

## 2024-04-09 PROCEDURE — ? COSMETIC CONSULTATION: FACIAL

## 2024-04-24 ENCOUNTER — APPOINTMENT (RX ONLY)
Dept: URBAN - METROPOLITAN AREA CLINIC 95 | Facility: CLINIC | Age: 64
Setting detail: DERMATOLOGY
End: 2024-04-24

## 2024-04-24 DIAGNOSIS — Z41.9 ENCOUNTER FOR PROCEDURE FOR PURPOSES OTHER THAN REMEDYING HEALTH STATE, UNSPECIFIED: ICD-10-CM

## 2024-04-24 PROCEDURE — ? FACIAL

## 2024-04-24 ASSESSMENT — LOCATION SIMPLE DESCRIPTION DERM
LOCATION SIMPLE: RIGHT FOREHEAD
LOCATION SIMPLE: LEFT LIP
LOCATION SIMPLE: RIGHT CHEEK
LOCATION SIMPLE: LEFT CHEEK

## 2024-04-24 ASSESSMENT — LOCATION DETAILED DESCRIPTION DERM
LOCATION DETAILED: LEFT CENTRAL MALAR CHEEK
LOCATION DETAILED: RIGHT CENTRAL MALAR CHEEK
LOCATION DETAILED: LEFT LOWER CUTANEOUS LIP
LOCATION DETAILED: RIGHT SUPERIOR MEDIAL FOREHEAD

## 2024-04-24 ASSESSMENT — LOCATION ZONE DERM
LOCATION ZONE: LIP
LOCATION ZONE: FACE

## 2024-04-24 NOTE — PROCEDURE: FACIAL
Comments (Sticky): No charge for visit missed a capillary
Led Light (Optional): red
Detail Level: Zone
Treatment Type Override: red light no charge
Price (Use Numbers Only, No Special Characters Or $): 200.00

## 2024-06-19 ENCOUNTER — APPOINTMENT (RX ONLY)
Dept: URBAN - METROPOLITAN AREA CLINIC 95 | Facility: CLINIC | Age: 64
Setting detail: DERMATOLOGY
End: 2024-06-19

## 2024-06-19 DIAGNOSIS — Z41.9 ENCOUNTER FOR PROCEDURE FOR PURPOSES OTHER THAN REMEDYING HEALTH STATE, UNSPECIFIED: ICD-10-CM

## 2024-06-19 PROCEDURE — ? ZO 3 STEP PEEL

## 2024-06-19 NOTE — PROCEDURE: ZO 3 STEP PEEL
Post Peel Care: Immediately after the peel, topical 6% retinol creme was applied, followed by topical calming cream. The patient was given these to take home and instructed to apply the retinol cream the next day and the calming creme as needed for dryness.
Post-Care Instructions: I reviewed with the patient in detail post-care instructions. Patient should avoid sun exposure and wear sun protection.
Detail Level: Zone
Price (Use Numbers Only, No Special Characters Or $): 250.00
Treatment Number: 0
Consent: Prior to the procedure, written consent was obtained and risks were reviewed, including but not limited to: redness, peeling, blistering, pigmentary change, scarring, infection, and pain. Patient is aware multiple treatments may be necessary to achieve the desired outcome.
Prep: The treated area was degreased with pre-peel cleanser, and vaseline was applied for protection of mucous membranes.

## 2024-07-11 ENCOUNTER — APPOINTMENT (RX ONLY)
Dept: URBAN - METROPOLITAN AREA CLINIC 95 | Facility: CLINIC | Age: 64
Setting detail: DERMATOLOGY
End: 2024-07-11

## 2024-07-11 DIAGNOSIS — Z41.9 ENCOUNTER FOR PROCEDURE FOR PURPOSES OTHER THAN REMEDYING HEALTH STATE, UNSPECIFIED: ICD-10-CM

## 2024-07-11 PROCEDURE — ? COSMETIC CONSULTATION: FACIAL

## 2024-08-27 ENCOUNTER — APPOINTMENT (RX ONLY)
Dept: URBAN - METROPOLITAN AREA CLINIC 95 | Facility: CLINIC | Age: 64
Setting detail: DERMATOLOGY
End: 2024-08-27

## 2024-08-27 DIAGNOSIS — Z41.9 ENCOUNTER FOR PROCEDURE FOR PURPOSES OTHER THAN REMEDYING HEALTH STATE, UNSPECIFIED: ICD-10-CM

## 2024-08-27 PROCEDURE — ? COSMETIC CONSULTATION: FACIAL

## 2024-10-16 ENCOUNTER — APPOINTMENT (RX ONLY)
Dept: URBAN - METROPOLITAN AREA CLINIC 104 | Facility: CLINIC | Age: 64
Setting detail: DERMATOLOGY
End: 2024-10-16

## 2024-10-16 DIAGNOSIS — L81.4 OTHER MELANIN HYPERPIGMENTATION: ICD-10-CM

## 2024-10-16 DIAGNOSIS — L57.0 ACTINIC KERATOSIS: ICD-10-CM | Status: INADEQUATELY CONTROLLED

## 2024-10-16 DIAGNOSIS — L57.8 OTHER SKIN CHANGES DUE TO CHRONIC EXPOSURE TO NONIONIZING RADIATION: ICD-10-CM

## 2024-10-16 DIAGNOSIS — L82.0 INFLAMED SEBORRHEIC KERATOSIS: ICD-10-CM | Status: INADEQUATELY CONTROLLED

## 2024-10-16 DIAGNOSIS — L0390 CELLULITIS AND ABSCESS OF UNSPECIFIED SITES: ICD-10-CM | Status: INADEQUATELY CONTROLLED

## 2024-10-16 DIAGNOSIS — D22 MELANOCYTIC NEVI: ICD-10-CM

## 2024-10-16 DIAGNOSIS — L0391 CELLULITIS AND ABSCESS OF UNSPECIFIED SITES: ICD-10-CM | Status: INADEQUATELY CONTROLLED

## 2024-10-16 DIAGNOSIS — D49.2 NEOPLASM OF UNSPECIFIED BEHAVIOR OF BONE, SOFT TISSUE, AND SKIN: ICD-10-CM

## 2024-10-16 PROBLEM — D22.5 MELANOCYTIC NEVI OF TRUNK: Status: ACTIVE | Noted: 2024-10-16

## 2024-10-16 PROBLEM — L02.212 CUTANEOUS ABSCESS OF BACK [ANY PART, EXCEPT BUTTOCK]: Status: ACTIVE | Noted: 2024-10-16

## 2024-10-16 PROBLEM — D23.71 OTHER BENIGN NEOPLASM OF SKIN OF RIGHT LOWER LIMB, INCLUDING HIP: Status: ACTIVE | Noted: 2024-10-16

## 2024-10-16 PROBLEM — D22.71 MELANOCYTIC NEVI OF RIGHT LOWER LIMB, INCLUDING HIP: Status: ACTIVE | Noted: 2024-10-16

## 2024-10-16 PROBLEM — D23.72 OTHER BENIGN NEOPLASM OF SKIN OF LEFT LOWER LIMB, INCLUDING HIP: Status: ACTIVE | Noted: 2024-10-16

## 2024-10-16 PROBLEM — D22.72 MELANOCYTIC NEVI OF LEFT LOWER LIMB, INCLUDING HIP: Status: ACTIVE | Noted: 2024-10-16

## 2024-10-16 PROCEDURE — 17110 DESTRUCTION B9 LES UP TO 14: CPT

## 2024-10-16 PROCEDURE — ? LIQUID NITROGEN

## 2024-10-16 PROCEDURE — 11102 TANGNTL BX SKIN SINGLE LES: CPT | Mod: 59

## 2024-10-16 PROCEDURE — 17003 DESTRUCT PREMALG LES 2-14: CPT | Mod: 59

## 2024-10-16 PROCEDURE — 17000 DESTRUCT PREMALG LESION: CPT | Mod: 59

## 2024-10-16 PROCEDURE — 99213 OFFICE O/P EST LOW 20 MIN: CPT | Mod: 25

## 2024-10-16 PROCEDURE — ? COUNSELING

## 2024-10-16 PROCEDURE — 10060 I&D ABSCESS SIMPLE/SINGLE: CPT

## 2024-10-16 PROCEDURE — ? BIOPSY BY SHAVE METHOD

## 2024-10-16 PROCEDURE — ? INCISION AND DRAINAGE

## 2024-10-16 ASSESSMENT — LOCATION DETAILED DESCRIPTION DERM
LOCATION DETAILED: LEFT CLAVICULAR NECK
LOCATION DETAILED: LEFT LATERAL UPPER BACK
LOCATION DETAILED: RIGHT PROXIMAL DORSAL FOREARM
LOCATION DETAILED: LEFT MEDIAL FOREHEAD
LOCATION DETAILED: LEFT DISTAL POSTERIOR UPPER ARM
LOCATION DETAILED: RIGHT DISTAL PRETIBIAL REGION
LOCATION DETAILED: LEFT MEDIAL TRAPEZIAL NECK
LOCATION DETAILED: PERIUMBILICAL SKIN
LOCATION DETAILED: LEFT POSTERIOR SHOULDER
LOCATION DETAILED: RIGHT DISTAL CALF
LOCATION DETAILED: INFERIOR THORACIC SPINE
LOCATION DETAILED: RIGHT ANTERIOR DISTAL UPPER ARM
LOCATION DETAILED: LEFT LATERAL INFERIOR CHEST
LOCATION DETAILED: LEFT LATERAL TRAPEZIAL NECK
LOCATION DETAILED: RIGHT ANTERIOR PROXIMAL THIGH
LOCATION DETAILED: RIGHT MEDIAL SUPERIOR CHEST
LOCATION DETAILED: LEFT ANTERIOR DISTAL THIGH
LOCATION DETAILED: LEFT DISTAL POSTERIOR THIGH
LOCATION DETAILED: LEFT SUPERIOR LATERAL MIDBACK
LOCATION DETAILED: LEFT LATERAL SUPERIOR CHEST
LOCATION DETAILED: RIGHT SUPERIOR LATERAL MIDBACK
LOCATION DETAILED: LEFT ANTERIOR PROXIMAL THIGH
LOCATION DETAILED: LEFT PROXIMAL POSTERIOR THIGH
LOCATION DETAILED: RIGHT SUPERIOR MEDIAL MIDBACK
LOCATION DETAILED: RIGHT SUPERIOR UPPER BACK
LOCATION DETAILED: RIGHT PROXIMAL POSTERIOR UPPER ARM
LOCATION DETAILED: LEFT SUPERIOR UPPER BACK
LOCATION DETAILED: RIGHT INFERIOR UPPER BACK
LOCATION DETAILED: LEFT PROXIMAL POSTERIOR UPPER ARM
LOCATION DETAILED: RIGHT SUPERIOR LATERAL UPPER BACK
LOCATION DETAILED: LEFT PROXIMAL DORSAL FOREARM
LOCATION DETAILED: EPIGASTRIC SKIN
LOCATION DETAILED: LEFT DISTAL CALF
LOCATION DETAILED: RIGHT CLAVICULAR SKIN
LOCATION DETAILED: RIGHT DISTAL POSTERIOR THIGH
LOCATION DETAILED: LEFT ANTERIOR PROXIMAL UPPER ARM

## 2024-10-16 ASSESSMENT — LOCATION ZONE DERM
LOCATION ZONE: FACE
LOCATION ZONE: TRUNK
LOCATION ZONE: NECK
LOCATION ZONE: LEG
LOCATION ZONE: ARM

## 2024-10-16 ASSESSMENT — LOCATION SIMPLE DESCRIPTION DERM
LOCATION SIMPLE: RIGHT CLAVICULAR SKIN
LOCATION SIMPLE: RIGHT UPPER BACK
LOCATION SIMPLE: RIGHT POSTERIOR THIGH
LOCATION SIMPLE: POSTERIOR NECK
LOCATION SIMPLE: CHEST
LOCATION SIMPLE: LEFT UPPER ARM
LOCATION SIMPLE: RIGHT CALF
LOCATION SIMPLE: LEFT ANTERIOR NECK
LOCATION SIMPLE: LEFT FOREHEAD
LOCATION SIMPLE: RIGHT PRETIBIAL REGION
LOCATION SIMPLE: LEFT POSTERIOR THIGH
LOCATION SIMPLE: RIGHT FOREARM
LOCATION SIMPLE: UPPER BACK
LOCATION SIMPLE: LEFT LOWER BACK
LOCATION SIMPLE: RIGHT LOWER BACK
LOCATION SIMPLE: RIGHT THIGH
LOCATION SIMPLE: LEFT UPPER BACK
LOCATION SIMPLE: LEFT FOREARM
LOCATION SIMPLE: LEFT THIGH
LOCATION SIMPLE: ABDOMEN
LOCATION SIMPLE: LEFT CALF
LOCATION SIMPLE: LEFT SHOULDER
LOCATION SIMPLE: RIGHT UPPER ARM

## 2024-10-16 ASSESSMENT — PAIN INTENSITY VAS: HOW INTENSE IS YOUR PAIN 0 BEING NO PAIN, 10 BEING THE MOST SEVERE PAIN POSSIBLE?: NO PAIN

## 2024-10-16 ASSESSMENT — SEVERITY ASSESSMENT: SEVERITY: MILD TO MODERATE

## 2024-10-16 NOTE — PROCEDURE: BIOPSY BY SHAVE METHOD
Detail Level: Detailed
Depth Of Biopsy: dermis
Was A Bandage Applied: Yes
Size Of Lesion In Cm: 0
Biopsy Type: H and E
Biopsy Method: 15 blade
Anesthesia Type: 1% lidocaine with epinephrine
Anesthesia Volume In Cc: 0.7
Hemostasis: Aluminum Chloride and Electrocautery
Wound Care: Vaseline
Dressing: bandage
Destruction After The Procedure: No
Type Of Destruction Used: Curettage
Curettage Text: The wound bed was treated with curettage after the biopsy was performed.
Cryotherapy Text: The wound bed was treated with cryotherapy after the biopsy was performed.
Electrodesiccation Text: The wound bed was treated with electrodesiccation after the biopsy was performed.
Electrodesiccation And Curettage Text: The wound bed was treated with electrodesiccation and curettage after the biopsy was performed.
Silver Nitrate Text: The wound bed was treated with silver nitrate after the biopsy was performed.
Lab: -[id number]
Consent: Written consent was obtained and risks were reviewed including but not limited to scarring, infection, bleeding, scabbing, incomplete removal, nerve damage and allergy to anesthesia.
Post-Care Instructions: I reviewed with the patient in detail post-care instructions. Patient is to keep the biopsy site dry overnight, and then apply Vaseline or Aquaphor twice daily until healed. Patient may apply hydrogen peroxide soaks to remove any crusting.
Notification Instructions: Patient will be notified of biopsy results. However, patient instructed to call the office if not contacted within 2 weeks.
Billing Type: Third-Party Bill
Information: Selecting Yes will display possible errors in your note based on the variables you have selected. This validation is only offered as a suggestion for you. PLEASE NOTE THAT THE VALIDATION TEXT WILL BE REMOVED WHEN YOU FINALIZE YOUR NOTE. IF YOU WANT TO FAX A PRELIMINARY NOTE YOU WILL NEED TO TOGGLE THIS TO 'NO' IF YOU DO NOT WANT IT IN YOUR FAXED NOTE.

## 2024-10-16 NOTE — PROCEDURE: LIQUID NITROGEN
Render Post-Care Instructions In Note?: yes
Number Of Freeze-Thaw Cycles: 1 freeze-thaw cycle
Consent: The patient's consent was obtained including but not limited to risks of crusting, scabbing, blistering, scarring, darker or lighter pigmentary change, recurrence, incomplete removal and infection.
Application Tool (Optional): Liquid Nitrogen Sprayer
Render Note In Bullet Format When Appropriate: No
Post-Care Instructions: I reviewed with the patient in detail post-care instructions. Patient is to wear sunprotection, and avoid picking at any of the treated lesions. Pt may apply Vaseline to crusted or scabbing areas.
Duration Of Freeze Thaw-Cycle (Seconds): 0
Detail Level: Detailed
Number Of Freeze-Thaw Cycles: 2 freeze-thaw cycles
Medical Necessity Information: It is in your best interest to select a reason for this procedure from the list below. All of these items fulfill various CMS LCD requirements except the new and changing color options.
Spray Paint Text: The liquid nitrogen was applied to the skin utilizing a spray paint frosting technique.
Medical Necessity Clause: This procedure was medically necessary because the lesions that were treated were:

## 2024-10-16 NOTE — PROCEDURE: INCISION AND DRAINAGE
Detail Level: Simple
Lesion Type: Cyst
Method: 11 blade
Curette: No
Anesthesia Type: 1% lidocaine with epinephrine
Anesthesia Volume In Cc: 0.7
Size Of Lesion In Cm (Optional But May Be Required For Some Insurances): 0
Drainage Amount?: minimal
Drainage Type?: purulent  and cyst-like
Wound Care: Petrolatum
Dressing: Band-Aid
Epidermal Sutures: 4-0 Ethilon
Epidermal Closure: simple interrupted
Suture Text: The incision was partially closed with
Preparation Text: The area was prepped in the usual clean fashion.
Curette Text (Optional): After the contents were expressed a curette was used to partially remove the cyst wall.
Consent was obtained and risks were reviewed including but not limited to delayed wound healing, infection, need for multiple I and D's, and pain.
Render Postcare In Note?: Yes
Post-Care Instructions: I reviewed with the patient in detail post-care instructions. Patient should keep wound covered and call the office should any redness, pain, swelling or worsening occur.

## 2025-02-12 ENCOUNTER — APPOINTMENT (OUTPATIENT)
Dept: URBAN - METROPOLITAN AREA CLINIC 104 | Facility: CLINIC | Age: 65
Setting detail: DERMATOLOGY
End: 2025-02-12

## 2025-02-12 DIAGNOSIS — L57.8 OTHER SKIN CHANGES DUE TO CHRONIC EXPOSURE TO NONIONIZING RADIATION: ICD-10-CM

## 2025-02-12 DIAGNOSIS — B07.8 OTHER VIRAL WARTS: ICD-10-CM

## 2025-02-12 DIAGNOSIS — D49.2 NEOPLASM OF UNSPECIFIED BEHAVIOR OF BONE, SOFT TISSUE, AND SKIN: ICD-10-CM

## 2025-02-12 DIAGNOSIS — D22 MELANOCYTIC NEVI: ICD-10-CM

## 2025-02-12 PROBLEM — D22.5 MELANOCYTIC NEVI OF TRUNK: Status: ACTIVE | Noted: 2025-02-12

## 2025-02-12 PROBLEM — D23.71 OTHER BENIGN NEOPLASM OF SKIN OF RIGHT LOWER LIMB, INCLUDING HIP: Status: ACTIVE | Noted: 2025-02-12

## 2025-02-12 PROBLEM — D23.72 OTHER BENIGN NEOPLASM OF SKIN OF LEFT LOWER LIMB, INCLUDING HIP: Status: ACTIVE | Noted: 2025-02-12

## 2025-02-12 PROCEDURE — ? BIOPSY BY SHAVE METHOD

## 2025-02-12 PROCEDURE — ? SUNSCREEN RECOMMENDATIONS

## 2025-02-12 PROCEDURE — 11103 TANGNTL BX SKIN EA SEP/ADDL: CPT | Mod: 59

## 2025-02-12 PROCEDURE — 11102 TANGNTL BX SKIN SINGLE LES: CPT | Mod: 59

## 2025-02-12 PROCEDURE — ? LIQUID NITROGEN

## 2025-02-12 PROCEDURE — 17110 DESTRUCTION B9 LES UP TO 14: CPT

## 2025-02-12 PROCEDURE — 99213 OFFICE O/P EST LOW 20 MIN: CPT | Mod: 25

## 2025-02-12 PROCEDURE — ? COUNSELING

## 2025-02-12 ASSESSMENT — LOCATION DETAILED DESCRIPTION DERM
LOCATION DETAILED: RIGHT ANTERIOR SHOULDER
LOCATION DETAILED: PERIUMBILICAL SKIN
LOCATION DETAILED: LEFT DISTAL CALF
LOCATION DETAILED: LEFT MEDIAL DISTAL PRETIBIAL REGION
LOCATION DETAILED: RIGHT INFERIOR CENTRAL MALAR CHEEK
LOCATION DETAILED: LEFT CLAVICULAR NECK
LOCATION DETAILED: RIGHT CLAVICULAR SKIN
LOCATION DETAILED: RIGHT CLAVICULAR NECK

## 2025-02-12 ASSESSMENT — LOCATION SIMPLE DESCRIPTION DERM
LOCATION SIMPLE: RIGHT ANTERIOR NECK
LOCATION SIMPLE: LEFT PRETIBIAL REGION
LOCATION SIMPLE: RIGHT CHEEK
LOCATION SIMPLE: RIGHT SHOULDER
LOCATION SIMPLE: ABDOMEN
LOCATION SIMPLE: LEFT ANTERIOR NECK
LOCATION SIMPLE: RIGHT CLAVICULAR SKIN
LOCATION SIMPLE: LEFT CALF

## 2025-02-12 ASSESSMENT — LOCATION ZONE DERM
LOCATION ZONE: FACE
LOCATION ZONE: LEG
LOCATION ZONE: NECK
LOCATION ZONE: ARM
LOCATION ZONE: TRUNK

## 2025-02-12 NOTE — PROCEDURE: LIQUID NITROGEN
Medical Necessity Clause: This procedure was medically necessary because the lesions that were treated were:
Add 52 Modifier (Optional): no
Consent: The patient's consent was obtained including but not limited to risks of crusting, scabbing, blistering, scarring, darker or lighter pigmentary change, recurrence, incomplete removal and infection.
Application Tool (Optional): Liquid Nitrogen Sprayer
Show Topical Anesthesia Variable?: Yes
Detail Level: Detailed
Number Of Freeze-Thaw Cycles: 2 freeze-thaw cycles
Post-Care Instructions: I reviewed with the patient in detail post-care instructions. Patient is to wear sunprotection, and avoid picking at any of the treated lesions. Pt may apply Vaseline to crusted or scabbing areas.
Medical Necessity Information: It is in your best interest to select a reason for this procedure from the list below. All of these items fulfill various CMS LCD requirements except the new and changing color options.
Spray Paint Text: The liquid nitrogen was applied to the skin utilizing a spray paint frosting technique.

## 2025-02-12 NOTE — PROCEDURE: BIOPSY BY SHAVE METHOD
Detail Level: Detailed
Depth Of Biopsy: dermis
Was A Bandage Applied: Yes
Size Of Lesion In Cm: 0.3
Biopsy Type: H and E
Biopsy Method: 15 blade
Anesthesia Type: 1% lidocaine with epinephrine
Anesthesia Volume In Cc: 0.5
Additional Anesthesia Volume In Cc (Will Not Render If 0): 0
Hemostasis: Electrocautery
Wound Care: Vaseline
Dressing: bandage
Destruction After The Procedure: No
Type Of Destruction Used: Curettage
Curettage Text: The wound bed was treated with curettage after the biopsy was performed.
Cryotherapy Text: The wound bed was treated with cryotherapy after the biopsy was performed.
Electrodesiccation Text: The wound bed was treated with electrodesiccation after the biopsy was performed.
Electrodesiccation And Curettage Text: The wound bed was treated with electrodesiccation and curettage after the biopsy was performed.
Silver Nitrate Text: The wound bed was treated with silver nitrate after the biopsy was performed.
Lab: -[id number]
Medical Necessity Information: It is in your best interest to select a reason for this procedure from the list below. All of these items fulfill various CMS LCD requirements except the new and changing color options.
Consent: Written consent was obtained and risks were reviewed including but not limited to scarring, infection, bleeding, scabbing, incomplete removal, nerve damage and allergy to anesthesia.
Post-Care Instructions: I reviewed with the patient in detail post-care instructions. Patient is to keep the biopsy site dry overnight, and then apply Vaseline or Aquaphor twice daily until healed. Patient may apply hydrogen peroxide soaks to remove any crusting.
Notification Instructions: Patient will be notified of biopsy results. However, patient instructed to call the office if not contacted within 2 weeks.
Billing Type: Third-Party Bill
Information: Selecting Yes will display possible errors in your note based on the variables you have selected. This validation is only offered as a suggestion for you. PLEASE NOTE THAT THE VALIDATION TEXT WILL BE REMOVED WHEN YOU FINALIZE YOUR NOTE. IF YOU WANT TO FAX A PRELIMINARY NOTE YOU WILL NEED TO TOGGLE THIS TO 'NO' IF YOU DO NOT WANT IT IN YOUR FAXED NOTE.

## 2025-06-12 ENCOUNTER — APPOINTMENT (OUTPATIENT)
Dept: URBAN - METROPOLITAN AREA CLINIC 104 | Facility: CLINIC | Age: 65
Setting detail: DERMATOLOGY
End: 2025-06-12

## 2025-06-12 DIAGNOSIS — L70.0 ACNE VULGARIS: ICD-10-CM | Status: IMPROVED

## 2025-06-12 DIAGNOSIS — L82.0 INFLAMED SEBORRHEIC KERATOSIS: ICD-10-CM | Status: WORSENING

## 2025-06-12 DIAGNOSIS — D22 MELANOCYTIC NEVI: ICD-10-CM

## 2025-06-12 DIAGNOSIS — L20.89 OTHER ATOPIC DERMATITIS: ICD-10-CM

## 2025-06-12 DIAGNOSIS — L81.4 OTHER MELANIN HYPERPIGMENTATION: ICD-10-CM

## 2025-06-12 DIAGNOSIS — B07.8 OTHER VIRAL WARTS: ICD-10-CM

## 2025-06-12 DIAGNOSIS — L57.8 OTHER SKIN CHANGES DUE TO CHRONIC EXPOSURE TO NONIONIZING RADIATION: ICD-10-CM

## 2025-06-12 DIAGNOSIS — L57.0 ACTINIC KERATOSIS: ICD-10-CM

## 2025-06-12 PROBLEM — D22.5 MELANOCYTIC NEVI OF TRUNK: Status: ACTIVE | Noted: 2025-06-12

## 2025-06-12 PROBLEM — D22.71 MELANOCYTIC NEVI OF RIGHT LOWER LIMB, INCLUDING HIP: Status: ACTIVE | Noted: 2025-06-12

## 2025-06-12 PROBLEM — D22.72 MELANOCYTIC NEVI OF LEFT LOWER LIMB, INCLUDING HIP: Status: ACTIVE | Noted: 2025-06-12

## 2025-06-12 PROCEDURE — ? LIQUID NITROGEN

## 2025-06-12 PROCEDURE — ?: Mod: 25

## 2025-06-12 PROCEDURE — ?: Mod: 59

## 2025-06-12 PROCEDURE — ? COUNSELING

## 2025-06-12 PROCEDURE — ? PRESCRIPTION

## 2025-06-12 PROCEDURE — ? PRESCRIPTION MEDICATION MANAGEMENT

## 2025-06-12 PROCEDURE — ?

## 2025-06-12 RX ORDER — TRETIONIN 0.25 MG/G
CREAM TOPICAL QHS
Qty: 20 | Refills: 3 | Status: ERX | COMMUNITY
Start: 2025-06-12

## 2025-06-12 RX ORDER — HYDROCORTISONE 25 MG/G
CREAM TOPICAL
Qty: 30 | Refills: 0 | Status: ERX | COMMUNITY
Start: 2025-06-12

## 2025-06-12 RX ORDER — HYDROQUINONE 40 MG/G
CREAM TOPICAL
Qty: 28.4 | Refills: 6 | Status: ERX | COMMUNITY
Start: 2025-06-12

## 2025-06-12 RX ADMIN — TRETIONIN: 0.25 CREAM TOPICAL at 00:00

## 2025-06-12 RX ADMIN — HYDROCORTISONE: 25 CREAM TOPICAL at 00:00

## 2025-06-12 RX ADMIN — HYDROQUINONE: 40 CREAM TOPICAL at 00:00

## 2025-06-12 ASSESSMENT — LOCATION SIMPLE DESCRIPTION DERM
LOCATION SIMPLE: LEFT FOREARM
LOCATION SIMPLE: RIGHT THIGH
LOCATION SIMPLE: LEFT AXILLARY VAULT
LOCATION SIMPLE: RIGHT CALF
LOCATION SIMPLE: RIGHT CHEEK
LOCATION SIMPLE: LEFT SHOULDER
LOCATION SIMPLE: LEFT FOREHEAD
LOCATION SIMPLE: RIGHT CLAVICULAR SKIN
LOCATION SIMPLE: LEFT UPPER ARM
LOCATION SIMPLE: LEFT ANTERIOR NECK
LOCATION SIMPLE: CHEST
LOCATION SIMPLE: LEFT CHEEK
LOCATION SIMPLE: RIGHT THIGH
LOCATION SIMPLE: RIGHT UPPER BACK
LOCATION SIMPLE: RIGHT FOREARM
LOCATION SIMPLE: RIGHT UPPER ARM
LOCATION SIMPLE: LEFT LOWER BACK
LOCATION SIMPLE: LEFT UPPER BACK
LOCATION SIMPLE: LEFT POSTERIOR THIGH
LOCATION SIMPLE: LEFT THIGH
LOCATION SIMPLE: RIGHT POSTERIOR THIGH
LOCATION SIMPLE: ABDOMEN
LOCATION SIMPLE: LEFT CALF
LOCATION SIMPLE: RIGHT LOWER BACK
LOCATION SIMPLE: RIGHT PRETIBIAL REGION
LOCATION SIMPLE: RIGHT EAR
LOCATION SIMPLE: POSTERIOR NECK

## 2025-06-12 ASSESSMENT — ITCH NUMERIC RATING SCALE: HOW SEVERE IS YOUR ITCHING?: 2

## 2025-06-12 ASSESSMENT — LOCATION DETAILED DESCRIPTION DERM
LOCATION DETAILED: LEFT ANTERIOR DISTAL THIGH
LOCATION DETAILED: RIGHT SUPERIOR MEDIAL MIDBACK
LOCATION DETAILED: LEFT DISTAL POSTERIOR THIGH
LOCATION DETAILED: LEFT LATERAL SUPERIOR CHEST
LOCATION DETAILED: RIGHT SUPERIOR UPPER BACK
LOCATION DETAILED: LEFT ANTERIOR PROXIMAL THIGH
LOCATION DETAILED: LEFT PROXIMAL POSTERIOR UPPER ARM
LOCATION DETAILED: LEFT DISTAL POSTERIOR UPPER ARM
LOCATION DETAILED: RIGHT SUPERIOR LATERAL MIDBACK
LOCATION DETAILED: LEFT AXILLARY VAULT
LOCATION DETAILED: RIGHT PROXIMAL POSTERIOR UPPER ARM
LOCATION DETAILED: LEFT MEDIAL FOREHEAD
LOCATION DETAILED: RIGHT ANTERIOR DISTAL THIGH
LOCATION DETAILED: LEFT LATERAL INFERIOR CHEST
LOCATION DETAILED: LEFT DISTAL DORSAL FOREARM
LOCATION DETAILED: RIGHT SUPERIOR LATERAL UPPER BACK
LOCATION DETAILED: LEFT PROXIMAL POSTERIOR THIGH
LOCATION DETAILED: RIGHT DISTAL PRETIBIAL REGION
LOCATION DETAILED: RIGHT DISTAL POSTERIOR THIGH
LOCATION DETAILED: LEFT POSTERIOR SHOULDER
LOCATION DETAILED: LEFT DISTAL CALF
LOCATION DETAILED: RIGHT POSTAURICULAR CREASE
LOCATION DETAILED: LEFT CLAVICULAR NECK
LOCATION DETAILED: LEFT SUPERIOR UPPER BACK
LOCATION DETAILED: LEFT INFERIOR CENTRAL MALAR CHEEK
LOCATION DETAILED: LEFT LATERAL TRAPEZIAL NECK
LOCATION DETAILED: RIGHT INFERIOR UPPER BACK
LOCATION DETAILED: RIGHT CLAVICULAR SKIN
LOCATION DETAILED: RIGHT ANTERIOR PROXIMAL THIGH
LOCATION DETAILED: RIGHT ANTERIOR PROXIMAL THIGH
LOCATION DETAILED: PERIUMBILICAL SKIN
LOCATION DETAILED: RIGHT INFERIOR CENTRAL MALAR CHEEK
LOCATION DETAILED: LEFT ANTERIOR SHOULDER
LOCATION DETAILED: RIGHT MEDIAL SUPERIOR CHEST
LOCATION DETAILED: LEFT PROXIMAL DORSAL FOREARM
LOCATION DETAILED: LEFT LATERAL UPPER BACK
LOCATION DETAILED: EPIGASTRIC SKIN
LOCATION DETAILED: LEFT SUPERIOR LATERAL MIDBACK
LOCATION DETAILED: RIGHT DISTAL CALF
LOCATION DETAILED: RIGHT ANTERIOR DISTAL UPPER ARM
LOCATION DETAILED: RIGHT PROXIMAL DORSAL FOREARM
LOCATION DETAILED: LEFT ANTERIOR PROXIMAL UPPER ARM

## 2025-06-12 ASSESSMENT — BSA ECZEMA: % BODY COVERED IN ECZEMA: 5

## 2025-06-12 ASSESSMENT — PAIN INTENSITY VAS
HOW INTENSE IS YOUR PAIN 0 BEING NO PAIN, 10 BEING THE MOST SEVERE PAIN POSSIBLE?: NO PAIN
HOW INTENSE IS YOUR PAIN 0 BEING NO PAIN, 10 BEING THE MOST SEVERE PAIN POSSIBLE?: 5/10 PAIN

## 2025-06-12 ASSESSMENT — LOCATION ZONE DERM
LOCATION ZONE: ARM
LOCATION ZONE: AXILLAE
LOCATION ZONE: NECK
LOCATION ZONE: TRUNK
LOCATION ZONE: FACE
LOCATION ZONE: EAR
LOCATION ZONE: LEG
LOCATION ZONE: LEG

## 2025-06-12 ASSESSMENT — SEVERITY ASSESSMENT OVERALL AMONG ALL PATIENTS
IN YOUR EXPERIENCE, AMONG ALL PATIENTS YOU HAVE SEEN WITH THIS CONDITION, HOW SEVERE IS THIS PATIENT'S CONDITION?: MULTIPLE INFLAMMATORY LESIONS BUT NONINFLAMMATORY LESIONS PREDOMINATE

## 2025-06-12 ASSESSMENT — SEVERITY ASSESSMENT 2020: SEVERITY 2020: MILD

## 2025-06-12 NOTE — PROCEDURE: PRESCRIPTION MEDICATION MANAGEMENT
Initiate Treatment: hydroquinone 4 % topical cream \\nQuantity: 28.4 g\\nSig: Apply a pea-sized amount to the dark spots of the body and arms for 3 months, then hold for 1 month; repeat cycle as needed.
Detail Level: Zone
Render In Strict Bullet Format?: No
Discontinue Regimen: Tretinoin 0.1% cream
Initiate Treatment: tretinoin 0.025 % topical cream (Tandem)\\nQuantity: 20.0 g  Days Supply: 30\\nSig: Apply a pea sized amount to face at night every other night with moisturizer.
Initiate Treatment: hydrocortisone 2.5 % topical cream \\nQuantity: 30.0 g  Days Supply: 30\\nSig: Apply to rash twice daily x 2 wks, as needed for flares

## 2025-06-12 NOTE — PROCEDURE: LIQUID NITROGEN
Render Note In Bullet Format When Appropriate: No
Consent: The patient's consent was obtained including but not limited to risks of crusting, scabbing, blistering, scarring, darker or lighter pigmentary change, recurrence, incomplete removal and infection.
Show Aperture Variable?: Yes
Post-Care Instructions: I reviewed with the patient in detail post-care instructions. Patient is to wear sunprotection, and avoid picking at any of the treated lesions. Pt may apply Vaseline to crusted or scabbing areas.
Detail Level: Detailed
Application Tool (Optional): Liquid Nitrogen Sprayer
Medical Necessity Clause: This procedure was medically necessary because the lesions that were treated were:
Spray Paint Text: The liquid nitrogen was applied to the skin utilizing a spray paint frosting technique.
Number Of Freeze-Thaw Cycles: 2 freeze-thaw cycles
Medical Necessity Information: It is in your best interest to select a reason for this procedure from the list below. All of these items fulfill various CMS LCD requirements except the new and changing color options.
Number Of Freeze-Thaw Cycles: 1 freeze-thaw cycle
Duration Of Freeze Thaw-Cycle (Seconds): 0

## 2025-06-12 NOTE — PROCEDURE: COUNSELING
Detail Level: Detailed
Detail Level: Zone
Bactrim Pregnancy And Lactation Text: This medication is Pregnancy Category D and is known to cause fetal risk.  It is also excreted in breast milk.
Azithromycin Pregnancy And Lactation Text: This medication is considered safe during pregnancy and is also secreted in breast milk.
Dapsone Pregnancy And Lactation Text: This medication is Pregnancy Category C and is not considered safe during pregnancy or breast feeding.
Tazorac Pregnancy And Lactation Text: This medication is not safe during pregnancy. It is unknown if this medication is excreted in breast milk.
Topical Clindamycin Pregnancy And Lactation Text: This medication is Pregnancy Category B and is considered safe during pregnancy. It is unknown if it is excreted in breast milk.
Use Enhanced Medication Counseling?: No
Tetracycline Counseling: Patient counseled regarding possible photosensitivity and increased risk for sunburn.  Patient instructed to avoid sunlight, if possible.  When exposed to sunlight, patients should wear protective clothing, sunglasses, and sunscreen.  The patient was instructed to call the office immediately if the following severe adverse effects occur:  hearing changes, easy bruising/bleeding, severe headache, or vision changes.  The patient verbalized understanding of the proper use and possible adverse effects of tetracycline.  All of the patient's questions and concerns were addressed. Patient understands to avoid pregnancy while on therapy due to potential birth defects.
Birth Control Pills Pregnancy And Lactation Text: This medication should be avoided if pregnant and for the first 30 days post-partum.
Azelaic Acid Counseling: Patient counseled that medicine may cause skin irritation and to avoid applying near the eyes.  In the event of skin irritation, the patient was advised to reduce the amount of the drug applied or use it less frequently.   The patient verbalized understanding of the proper use and possible adverse effects of azelaic acid.  All of the patient's questions and concerns were addressed.
Benzoyl Peroxide Counseling: Patient counseled that medicine may cause skin irritation and bleach clothing.  In the event of skin irritation, the patient was advised to reduce the amount of the drug applied or use it less frequently.   The patient verbalized understanding of the proper use and possible adverse effects of benzoyl peroxide.  All of the patient's questions and concerns were addressed.
Topical Sulfur Applications Pregnancy And Lactation Text: This medication is Pregnancy Category C and has an unknown safety profile during pregnancy. It is unknown if this topical medication is excreted in breast milk.
High Dose Vitamin A Pregnancy And Lactation Text: High dose vitamin A therapy is contraindicated during pregnancy and breast feeding.
Winlevi Counseling:  I discussed with the patient the risks of topical clascoterone including but not limited to erythema, scaling, itching, and stinging. Patient voiced their understanding.
Erythromycin Pregnancy And Lactation Text: This medication is Pregnancy Category B and is considered safe during pregnancy. It is also excreted in breast milk.
Benzoyl Peroxide Pregnancy And Lactation Text: This medication is Pregnancy Category C. It is unknown if benzoyl peroxide is excreted in breast milk.
Doxycycline Pregnancy And Lactation Text: This medication is Pregnancy Category D and not consider safe during pregnancy. It is also excreted in breast milk but is considered safe for shorter treatment courses.
Topical Retinoid Pregnancy And Lactation Text: This medication is Pregnancy Category C. It is unknown if this medication is excreted in breast milk.
Sarecycline Counseling: Patient advised regarding possible photosensitivity and discoloration of the teeth, skin, lips, tongue and gums.  Patient instructed to avoid sunlight, if possible.  When exposed to sunlight, patients should wear protective clothing, sunglasses, and sunscreen.  The patient was instructed to call the office immediately if the following severe adverse effects occur:  hearing changes, easy bruising/bleeding, severe headache, or vision changes.  The patient verbalized understanding of the proper use and possible adverse effects of sarecycline.  All of the patient's questions and concerns were addressed.
Spironolactone Counseling: Patient advised regarding risks of diarrhea, abdominal pain, hyperkalemia, birth defects (for female patients), liver toxicity and renal toxicity. The patient may need blood work to monitor liver and kidney function and potassium levels while on therapy. The patient verbalized understanding of the proper use and possible adverse effects of spironolactone.  All of the patient's questions and concerns were addressed.
Isotretinoin Pregnancy And Lactation Text: This medication is Pregnancy Category X and is considered extremely dangerous during pregnancy. It is unknown if it is excreted in breast milk.
Aklief counseling:  Patient advised to apply a pea-sized amount only at bedtime and wait 30 minutes after washing their face before applying.  If too drying, patient may add a non-comedogenic moisturizer.  The most commonly reported side effects including irritation, redness, scaling, dryness, stinging, burning, itching, and increased risk of sunburn.  The patient verbalized understanding of the proper use and possible adverse effects of retinoids.  All of the patient's questions and concerns were addressed.
Dapsone Counseling: I discussed with the patient the risks of dapsone including but not limited to hemolytic anemia, agranulocytosis, rashes, methemoglobinemia, kidney failure, peripheral neuropathy, headaches, GI upset, and liver toxicity.  Patients who start dapsone require monitoring including baseline LFTs and weekly CBCs for the first month, then every month thereafter.  The patient verbalized understanding of the proper use and possible adverse effects of dapsone.  All of the patient's questions and concerns were addressed.
Topical Clindamycin Counseling: Patient counseled that this medication may cause skin irritation or allergic reactions.  In the event of skin irritation, the patient was advised to reduce the amount of the drug applied or use it less frequently.   The patient verbalized understanding of the proper use and possible adverse effects of clindamycin.  All of the patient's questions and concerns were addressed.
Spironolactone Pregnancy And Lactation Text: This medication can cause feminization of the male fetus and should be avoided during pregnancy. The active metabolite is also found in breast milk.
Aklief Pregnancy And Lactation Text: It is unknown if this medication is safe to use during pregnancy.  It is unknown if this medication is excreted in breast milk.  Breastfeeding women should use the topical cream on the smallest area of the skin for the shortest time needed while breastfeeding.  Do not apply to nipple and areola.
Bactrim Counseling:  I discussed with the patient the risks of sulfa antibiotics including but not limited to GI upset, allergic reaction, drug rash, diarrhea, dizziness, photosensitivity, and yeast infections.  Rarely, more serious reactions can occur including but not limited to aplastic anemia, agranulocytosis, methemoglobinemia, blood dyscrasias, liver or kidney failure, lung infiltrates or desquamative/blistering drug rashes.
Azelaic Acid Pregnancy And Lactation Text: This medication is considered safe during pregnancy and breast feeding.
Birth Control Pills Counseling: Birth Control Pill Counseling: I discussed with the patient the potential side effects of OCPs including but not limited to increased risk of stroke, heart attack, thrombophlebitis, deep venous thrombosis, hepatic adenomas, breast changes, GI upset, headaches, and depression.  The patient verbalized understanding of the proper use and possible adverse effects of OCPs. All of the patient's questions and concerns were addressed.
Azithromycin Counseling:  I discussed with the patient the risks of azithromycin including but not limited to GI upset, allergic reaction, drug rash, diarrhea, and yeast infections.
Include Pregnancy/Lactation Warning?: Add Automatically Based on Childbearing Potential and Patient Age
Minocycline Counseling: Patient advised regarding possible photosensitivity and discoloration of the teeth, skin, lips, tongue and gums.  Patient instructed to avoid sunlight, if possible.  When exposed to sunlight, patients should wear protective clothing, sunglasses, and sunscreen.  The patient was instructed to call the office immediately if the following severe adverse effects occur:  hearing changes, easy bruising/bleeding, severe headache, or vision changes.  The patient verbalized understanding of the proper use and possible adverse effects of minocycline.  All of the patient's questions and concerns were addressed.
Topical Sulfur Applications Counseling: Topical Sulfur Counseling: Patient counseled that this medication may cause skin irritation or allergic reactions.  In the event of skin irritation, the patient was advised to reduce the amount of the drug applied or use it less frequently.   The patient verbalized understanding of the proper use and possible adverse effects of topical sulfur application.  All of the patient's questions and concerns were addressed.
Minocycline Pregnancy And Lactation Text: This medication is Pregnancy Category D and not consider safe during pregnancy. It is also excreted in breast milk.
Topical Retinoid counseling:  Patient advised to apply a pea-sized amount only at bedtime and wait 30 minutes after washing their face before applying.  If too drying, patient may add a non-comedogenic moisturizer. The patient verbalized understanding of the proper use and possible adverse effects of retinoids.  All of the patient's questions and concerns were addressed.
High Dose Vitamin A Counseling: Side effects reviewed, pt to contact office should one occur.
Winlevi Pregnancy And Lactation Text: This medication is considered safe during pregnancy and breastfeeding.
Erythromycin Counseling:  I discussed with the patient the risks of erythromycin including but not limited to GI upset, allergic reaction, drug rash, diarrhea, increase in liver enzymes, and yeast infections.
Isotretinoin Counseling: Patient should get monthly blood tests, not donate blood, not drive at night if vision affected, not share medication, and not undergo elective surgery for 6 months after tx completed. Side effects reviewed, pt to contact office should one occur.
Tazorac Counseling:  Patient advised that medication is irritating and drying.  Patient may need to apply sparingly and wash off after an hour before eventually leaving it on overnight.  The patient verbalized understanding of the proper use and possible adverse effects of tazorac.  All of the patient's questions and concerns were addressed.
Doxycycline Counseling:  Patient counseled regarding possible photosensitivity and increased risk for sunburn.  Patient instructed to avoid sunlight, if possible.  When exposed to sunlight, patients should wear protective clothing, sunglasses, and sunscreen.  The patient was instructed to call the office immediately if the following severe adverse effects occur:  hearing changes, easy bruising/bleeding, severe headache, or vision changes.  The patient verbalized understanding of the proper use and possible adverse effects of doxycycline.  All of the patient's questions and concerns were addressed.

## 2025-06-13 RX ORDER — TRETIONIN 0.25 MG/G
CREAM TOPICAL QHS
Qty: 20 | Refills: 3 | Status: ERX